# Patient Record
Sex: FEMALE | Race: OTHER | HISPANIC OR LATINO | Employment: UNEMPLOYED | URBAN - METROPOLITAN AREA
[De-identification: names, ages, dates, MRNs, and addresses within clinical notes are randomized per-mention and may not be internally consistent; named-entity substitution may affect disease eponyms.]

---

## 2017-09-07 LAB
EXTERNAL ABO GROUPING: NORMAL
EXTERNAL HEMATOCRIT: 31.1 %
EXTERNAL HEMOGLOBIN: 10.4 G/DL
EXTERNAL HEPATITIS B SURFACE ANTIGEN: NON REACTIVE
EXTERNAL HIV-1 ANTIBODY: NEGATIVE
EXTERNAL PLATELET COUNT: 260 K/ΜL
EXTERNAL RH FACTOR: POSITIVE
EXTERNAL RUBELLA IGG QUANTITATION: NORMAL
EXTERNAL SYPHILIS RPR SCREEN: NORMAL

## 2017-09-11 LAB — EXTERNAL GROUP B STREP ANTIGEN: POSITIVE

## 2017-09-12 ENCOUNTER — GENERIC CONVERSION - ENCOUNTER (OUTPATIENT)
Dept: OTHER | Facility: OTHER | Age: 37
End: 2017-09-12

## 2017-11-09 ENCOUNTER — GENERIC CONVERSION - ENCOUNTER (OUTPATIENT)
Dept: OTHER | Facility: OTHER | Age: 37
End: 2017-11-09

## 2017-11-09 ENCOUNTER — ALLSCRIPTS OFFICE VISIT (OUTPATIENT)
Dept: OTHER | Facility: OTHER | Age: 37
End: 2017-11-09

## 2017-11-10 ENCOUNTER — GENERIC CONVERSION - ENCOUNTER (OUTPATIENT)
Dept: OTHER | Facility: OTHER | Age: 37
End: 2017-11-10

## 2017-11-10 ENCOUNTER — HOSPITAL ENCOUNTER (OUTPATIENT)
Facility: HOSPITAL | Age: 37
Discharge: HOME | End: 2017-11-10
Attending: OBSTETRICS & GYNECOLOGY | Admitting: OBSTETRICS & GYNECOLOGY

## 2017-11-10 VITALS
OXYGEN SATURATION: 100 % | DIASTOLIC BLOOD PRESSURE: 77 MMHG | TEMPERATURE: 97.9 F | HEART RATE: 106 BPM | RESPIRATION RATE: 18 BRPM | SYSTOLIC BLOOD PRESSURE: 114 MMHG

## 2017-11-10 DIAGNOSIS — O46.90 VAGINAL BLEEDING DURING PREGNANCY, ANTEPARTUM: Primary | ICD-10-CM

## 2017-11-10 DIAGNOSIS — Z3A.35 35 WEEKS GESTATION OF PREGNANCY: ICD-10-CM

## 2017-11-10 PROBLEM — N93.9 VAGINAL BLEEDING: Status: ACTIVE | Noted: 2017-11-10

## 2017-11-10 LAB
AMPHETAMINES SERPL QL SCN: NEGATIVE
BARBITURATES UR QL: NEGATIVE
BENZODIAZ UR QL: NEGATIVE
COCAINE UR QL: NEGATIVE
EXTERNAL GROUP B STREP ANTIGEN: POSITIVE
METHADONE UR QL: NEGATIVE
OPIATES UR QL SCN: NEGATIVE
PCP UR QL: NEGATIVE
THC UR QL: NEGATIVE

## 2017-11-10 PROCEDURE — 80307 DRUG TEST PRSMV CHEM ANLYZR: CPT | Performed by: FAMILY MEDICINE

## 2017-11-10 PROCEDURE — 76817 TRANSVAGINAL US OBSTETRIC: CPT | Performed by: OBSTETRICS & GYNECOLOGY

## 2017-11-10 PROCEDURE — 99215 OFFICE O/P EST HI 40 MIN: CPT

## 2017-11-10 PROCEDURE — 76811 OB US DETAILED SNGL FETUS: CPT | Performed by: OBSTETRICS & GYNECOLOGY

## 2017-11-10 RX ORDER — FERROUS SULFATE 325(65) MG
325 TABLET ORAL
COMMUNITY
End: 2019-11-19 | Stop reason: ALTCHOICE

## 2017-11-10 NOTE — PROGRESS NOTES
Consult Note - M  Natasha Cleary 44 y o  female MRN: [id number]  Unit/Bed#:  Triage  Encounter: [id number]    HPI:   Natasha Cleary is a 44-year-old  0-3 at 35 weeks and 5 days gestation who presented with painless vaginal bleeding for 5 days  She has previously seen for her prenatal intake interview at [facility] in [place] reports that she mentioned vaginal bleeding at that prenatal intake  There is concern for possible placenta previa or low lying placenta which prompted a Maternal-Fetal Medicine consult  Patient denies contractions, loss of fluid  She reports good fetal movement  Patient states vaginal bleeding is scant and approximated 1 tsp of vaginal bleeding noted on tissue paper when she wipes after urinating  This intermittent vaginal bleeding has been occurring for past 5 days  Patient reports most recent sexual intercourse occurred approximately 15 days ago  Speculum examination was performed and notable for negative evaluation via wet mount and KOH for any signs of infection  Speculum was also notable for lack of blood within the vagina, lack of cervical lacerations or trauma, lack of vaginal lacerations as source of bleeding  Her prenatal course is notable for late transfer of care to [facility] due to recent relocation to [facility]  Patient was previously receiving prenatal care in [address] [place] [address],[address] with ultrasounds pain done at [facility]  /77   Pulse (!) 106   Temp 97 9 °F (36 6 °C) (Oral)   Resp 18   LMP 2017   SpO2 100%     Physical Exam:   General: AAOx3  No acute distress  Heart: Regular rate & rhythm  No murmurs/clicks/gallops  Lungs: Clear bilaterally  Normal effort  No wheezes/rales/rhonchi  Abdominal: Soft, non-tender gravid uterus  Extremities: No TTP  Lower limbs symmetric bilaterally       FHT:  Baseline Rate: 130 bpm  Variability: Moderate 6-25 bpm  Accelerations: 15 x 15 or greater, At variable times  Decelerations: None  FHR Category: Category I  TOCO:   Contraction Frequency (minutes): 3-4  Contraction Duration (seconds): 60-80  Contraction Quality: Mild    Ultraounds:   17 -- dating ultrasound  Ultrasound measuring fetus at 25 1 weeks that is in concordance with patient estimated gestational age by last menstrual period 25 4 weeks  Estimated due date 12/10/2017  Fetal anomaly appeared normal on ultrasound  Fetal biometry was appropriate for gestational age and amniotic fluid was normal  Posterior low-lying placenta was noted  17 -- repeat ultrasound was indicated due to missed and suboptimal fetal anatomy at for scan  Again notable on ultrasound report for low lying placenta  Fetal heart was visualized and noted to be normal     10/19/17 -- ultrasound for fetal growth due to advanced maternal age and low lying placenta  Placenta noted to be anterior marginal previa  Adequate fetal growth since last ultrasound  10/26/17 -- ultrasound for fetal evaluation secondary to anterior marginal placenta previa  Placenta noted to be right lateral on examination via ultrasound  A/P: Toshia Daniel is a 44 y o  [id number] at 35w5d who presented with concerns for placenta previa and vaginal bleeding  Currently in stable condition  Hospital Day: 1  1) Formal ultrasound performed by [facility]  No signs of placenta previa  Ultrasound placed placenta at R lateral and not near internal os of cervix  - fetus is vertex and growth is appropriate for gestational age  2) Bleeding:  - minimal and speculum exam performed previously showed no signs of blood in the vagina, cervical lacerations, and no signs of trauma or dilation    - CL 5cm by transvaginal ultrasound  - no visual dilation  Recommend twice weekly NST and weekly TANI for further surveillance    center  Patient was set up for 1st NST and formal growth ultrasound at  Center on Wednesday, 11/15, at 08:00    Corie Foote MD  OBGYN, PGY3  11/11/2017 3:53 AM

## 2017-11-10 NOTE — PROGRESS NOTES
Triage Note - OB  Natasha Fagan 44 y o  female MRN: [id number]  Unit/Bed#: LD Triage 4-01 Encounter: [id number]    Chief Complaint: Vaginal bleeding  CATHY: Estimated Date of Delivery: 12/10/17    HPI: Patient is a 44 y [id number] at 35w5d here complaining of painless vaginal bleeding x 5 days  ~1teaspoon of liquid blood in the toilet bowl 1-2 times daily, which has progressively gotten worse and is now up to ~5-6 times daily  She had no bleeding yesterday and last episode was this morning  x2  She reports good Fm and denies any recent history of vaginal intercourse or other objects in vagina, abdominal trauma/pain, LOF, UC's, vagina discharge, dysuria, hematochezia, headaches, b/v, n/v/c/d, fevers, chills, recreational drug use  Vitals: Blood pressure 114/77, pulse (!) 106, temperature 97 9 °F (36 6 °C), temperature source Oral, resp  rate 18, last menstrual period 03/05/2017, SpO2 100 %  ,There is no height or weight on file to calculate BMI      Physical Exam  General: No Acute Distress   Cardiovascular: Regular, Rate and Rhythm, no murmur, gallop or rub   Lungs: Clear to Auscultation Bilaterally, no wheezing, rhonchi or rales   Abdomen: Gravid, Soft, non-tender, no guarding or CVA tenderness   Lower Extremities: Non-Tender     SVE: Deferred    FHR: Baseline: 150 bpm,   Variability: Moderate 6 - 25 bpm,   Accelerations: Reactive,   Decelerations: Absent  Category reassuring reactive    [place]: irregular, every 5-10 minutes (patient not feeling contractions)    Labs:   Recent Results (from the past 24 hour(s))   Strep B DNA probe, amplification    Collection Time: 11/10/17 12:00 AM   Result Value Ref Range    External Strep Group B Ag Positive (A) Negative, Unknown, None, Pending   Rapid drug screen, urine    Collection Time: 11/10/17 12:56 PM   Result Value Ref Range    Amph/Meth UR Negative Negative    Barbiturate Ur Negative Negative    Benzodiazepine Urine Negative Negative    Cocaine Urine Negative Negative Methadone Urine Negative Negative    Opiate Urine Negative Negative    PCP Ur Negative Negative    THC Urine Negative Negative     Imaging: TVUS & TAUS today performed by Dr Alejandra Barr showed low lying anterior placenta, no clear evidence of previa  Lab, Imaging and other studies: I have personally reviewed pertinent reports  Assessment/Plan: 44 y [id number] at 35w5d here complaining of vaginal bleeding x 2 days  1) External Fetal Monitoring:  Reactive/Reassuring  2) TAUS/TVUS: Low lying anterior placenta, no clear evidence of previa  3) Saline, Dry and KOH mount negative for Leuks, RBCs, Ferning, and Hyphae/Fungi  4) Urine Dip negative for Protein, Leuks, Blood, Nitrites, Glucose, Ph:7 0  5) Labs:    UDS: Negative (including negative for cocaine)   GBS positive    Other labs including coags, fibrinogen unnecessary at this time  6) MFM team consulted and cleared patient for discharge  We appreciate their input  Please view MFM note for details  Per MFM: Schedule Biweekly NST, Weekly TANI, and Anatomy US ASAP  7) Dispo: Discharge home today without any further interventions  Strict labor precautions provided and reviewed with patient and  using the blue phone  Return to clinic in 2-3 days        Enoch Abraham MD  Family Practice Resident PGY1  11/10/2017 3:15 PM

## 2017-11-10 NOTE — DISCHARGE INSTR - AVS FIRST PAGE
Please call and make an appointement to Return to the clinic, [facility], in 2-3 days to schedule biweekly NST, weekly TANI and Full anatomy Us, per MFM  Please read all aftercare educational resources thoroughly

## 2017-11-10 NOTE — DISCHARGE INSTRUCTIONS
El embarazo de la semana 35 a la 38   LO QUE NECESITA SABER:   Al principio de las 37 semanas se considera que usted está en término completo  Podría ser mas fácil que usted respire si beck bebé se ha posicionado con la juan hacia abajo  Es posible que usted necesite orinar con mayor frecuencia ya que el bebé podría estar presionando beck vejiga  Usted también podría sentir más molestias y cansarse fácilmente  INSTRUCCIONES SOBRE EL NARCISA HOSPITALARIA:   Regrese a la delfino de emergencias si:   Usted presenta un efren dolor de juan que no desaparece  Usted tiene [employer] visión nuevos o en aumento, loreta visión borrosa o con manchas  Usted tiene inflamación nueva o creciente en beck hua o maki  Usted tiene manchado o sangrado vaginal     Usted rompe elyssa o siente un chorro o gotas de agua tibia que le está bajando por beck vagina  Pregúntele a beck [employer] vitaminas y minerales son adecuados para usted  Usted tiene más de 5 contracciones en 1 hora  Usted nota algún [employer] movimientos de beck bebé  Usted tiene calambres, presión o tensión abdominal     Usted tiene un cambio en la secreción vaginal     Usted tiene escalofríos o fiebre  Usted tiene comezón, ardor o dolor vaginal      Usted tiene kennedy secreción vaginal amarillenta, verdosa, bobbi o de [employer]  Usted tiene dolor o ardor al [place], Kody Lowe menos de lo habitual o tiene orina rosada o sanguinolenta  Usted tiene preguntas o inquietudes acerca de beck condición o cuidado  Cómo cuidarse en esta etapa de beck embarazo:   Consuma alimentos saludables y variados  Alimentos saludables incluyen frutas, verduras, panes de obey integral, alimentos lácteos bajos en grasa, frijoles, sheyla magras y pescado  [place] líquidos loreta se le haya indicado  Pregunte cuánto líquido debe evaristo cada día y cuáles líquidos son los más adecuados para usted  Limite el consumo de cafeína a menos de [place] [address]   Limite el consumo de pescado a 2 porciones cada semana  Escoja pescado con concentraciones bajas de bridget loreta atún ligero enlatado, camarón, cangrejo, salmón, bacalao o tilapia  No  coma pescado con concentraciones altas de bridget loreta pez jania, caballa gigante, pargo rayado y tiburón  [address] [place] [address]  Beck necesidad de ciertas vitaminas y [address], loreta el ácido fólico, aumenta aida el [facility]  Las vitaminas prenatales proporcionan algunas de las vitaminas y minerales adicionales que usted necesita  Las vitaminas prenatales también podrían ayudar a disminuir el riesgo de ciertos defectos de nacimiento  Descanse tanto loreta sea necesario  Levante julia pies si tiene inflamación en julia tobillos y pies  No fume  Si usted fuma, nunca es demasiado tarde para dejar de hacerlo  Fumar aumenta el riesgo de aborto espontáneo y otros problemas de alize aida beck [facility]  Fumar puede causar que beck bebé nazca antes de tiempo o que pese menos al nacer  Solicite información a beck médico si usted necesita ayuda para dejar de fumar  No consuma alcohol  El alcohol pasa de beck cuerpo al bebé a través de la placenta  Puede afectar el desarrollo del cerebro de beck bebé y provocar el síndrome de alcoholismo fetal (SAF)  FAS es un tamiko de condiciones que causan [address], de comportamiento y de crecimiento  Consulte con beck médico antes de evaristo cualquier medicamento  Muchos medicamentos pueden perjudicar a beck bebé si usted los cricket [address]  No tome ningún medicamento, vitaminas, hierbas o suplementos sin socorro consultar con beck Wendy Stone  use drogas ilegales o de la cabrera (loreta marihuana o cocaína) mientras está embarazada  Hable con beck médico antes de viajar  Es posible que no pueda viajar en avión después de las 36 [facility]  También le puede recomendar que evite largos viajes por carretera  Consejos de seguridad:   Evite jacuzzis y saunas    No use un jacuzzi o un sauna mientras usted está embarazada, especialmente aida el primer trimestre  Los [employer] y los saunas aumentan la temperatura de tenorio bebé y el riesgo de defectos de nacimiento  Evite la toxoplasmosis  [place] es kennedy infección causada por comer carne cruda o estar cerca del excremento de un layla infectado  [place] puede causar malformaciones congénitas, aborto espontáneo y [place]  Lávese las maki después de tocar carne cruda  Asegúrese de que la carne esté za cocida antes de comerla  Evite los huevos crudos y la [place]  Use guantes o pida que alguien la ayude a limpiar la caja de arena del layla mientras usted Lana Valles  Consulte con tenorio médico acerca de viajar  El [address] cómodo para viajar es aida el irwin trimestre  Pregunte a tenorio médico si usted puede viajar después de las 36 semanas  Es posible que no pueda viajar en avión después de las 36 [address] [place]  También le puede recomendar que evite largos viajes por carretera  Cambios que están ocurriendo con tenorio bebé:  Para las 38 semanas, tenorio bebé podría pesar entre 6 y 5 libras  Tenorio bebé podría medir alrededor de 14 pulgadas de lita desde la punta de la juan hasta la rabadilla (parte inferior del bebé)  Tenorio bebé escucha lo suficientemente za loreta para reconocer tenorio voz  Conforme tenorio bebé crece más, usted podría sentir menos patadas y sentir más que tenorio bebé se estira y [place]  Tenorio bebé podría moverse en posición con la juan hacia abajo  Tenorio bebé también descansará en la parte inferior de tenorio abdomen  Lo que necesita saber acerca del cuidado prenatal:  Tenorio médico le revisará tenorio presión arterial y [place]  Es posible que también necesite lo siguiente:  Un examen de orina  también podría realizarse para revisarle el azúcar y la proteína  Estas son señales de diabetes gestacional o de infección  La proteína en tenorio orina también podría ser kennedy señal de preeclampsia   La preeclampsia es kennedy condición que puede desarrollarse aida la semana 20 o después en tenorio embarazo  Esta provoca presión arterial sesar y [employer] con julia riñones y [place]  Los análisis de gloria  se pueden realizar para revisar si tiene signos de anemia (nivel bajo del yamel)  La vacuna Tdap  podría ser recomendado por tenorio médico      El examen del estreptococo del tamiko B  es un examen que se realiza para verificar si hay infección por estreptococos del tamiko B  El estreptococo del tamiko B es un tipo de bacteria que se puede encontrar en la vagina o el recto  Podría ser transmitida a tenorio bebé aida el parto si usted la tiene  Tenorio médico podría evaristo [place] de tenorio vagina o recto y ana la [place] al laboratorio para que la examinen  La altura uterina  es kennedy medición del útero para controlar el desarrollo de tenorio bebé  [employer] por lo general es igual al número de [facility] que usted tiene de embarazo  Tenorio médico también podría revisar la posición de tenorio bebé  El ritmo cardíaco de tenorio bebé  será revisado  © 2017 [address] Information is for End User's use only and may not be sold, redistributed or otherwise used for commercial purposes  All illustrations and images included in CareNotes® are the copyrighted property of A D A M , Inc  or Erlin Conner  Esta información es sólo para uso en educación  Tenorio intención no es darle un consejo médico sobre enfermedades o tratamientos  Colsulte con tenorio Ladean Artist farmacéutico antes de seguir cualquier régimen médico para saber si es seguro y efectivo para usted  Pregnancy at 28 to [address]:   You are considered full term at the beginning of 37 weeks  Your breathing may be easier if your baby has moved down into a head-down position  You may need to urinate more often because the baby may be pressing on your bladder  You may also feel more discomfort and get tired easily     DISCHARGE INSTRUCTIONS:   Please do keep in mind that in your particular case, as you are well aware based on your discussion with us,  the low lying placenta and recent history of vaginal bleeding requires extra caution  Continue to avoid vaginal intercourse and avoid inserting any objects (including tampons, fingers, toys etc ) inside vagina, as you have been  If your do experience increased vaginal bleeding, as per our discussion today, do not delay seeking medical care  Seek care immediately if:   · You develop a severe headache that does not go away  · You have new or increased vision changes, such as blurred or spotted vision  · You have new or increased swelling in your face or hands  · You have vaginal spotting or bleeding  · Your water broke or you feel warm water gushing or trickling from your vagina  Contact your healthcare provider if:   · You have more than 5 contractions in 1 hour  · You notice any changes in your baby's movements  · You have abdominal cramps, pressure, or tightening  · You have a change in vaginal discharge  · You have chills or a fever  · You have vaginal itching, burning, or pain  · You have yellow, green, white, or foul-smelling vaginal discharge  · You have pain or burning when you urinate, less urine than usual, or pink or bloody urine  · You have questions or concerns about your condition or care  How to care for yourself at this stage of your pregnancy:   · Eat a variety of healthy foods  Healthy foods include fruits, vegetables, whole-grain breads, low-fat dairy foods, beans, lean meats, and fish  Drink liquids as directed  Ask how much liquid to drink each day and which liquids are best for you  Limit caffeine to less than 200 milligrams each day  Limit your intake of fish to 2 servings each week  Choose fish low in mercury such as canned light tuna, shrimp, salmon, cod, or tilapia  Do not  eat fish high in mercury such as swordfish, tilefish, venessa mackerel, and shark       · Take prenatal vitamins as directed  Your need for certain vitamins and minerals, such as folic acid, increases during pregnancy  Prenatal vitamins provide some of the extra vitamins and minerals you need  Prenatal vitamins may also help to decrease the risk of certain birth defects  · Rest as needed  Put your feet up if you have swelling in your ankles and feet  · Do not smoke  If you smoke, it is never too late to quit  Smoking increases your risk of a miscarriage and other health problems during your pregnancy  Smoking can cause your baby to be born too early or weigh less at birth  Ask your healthcare provider for information if you need help quitting  · Do not drink alcohol  Alcohol passes from your body to your baby through the placenta  It can affect your baby's brain development and cause fetal alcohol syndrome (FAS)  FAS is a group of conditions that causes mental, behavior, and growth problems  · Talk to your healthcare provider before you take any medicines  Many medicines may harm your baby if you take them when you are pregnant  Do not take any medicines, vitamins, herbs, or supplements without first talking to your healthcare provider  Never use illegal or street drugs (such as marijuana or cocaine) while you are pregnant  · Talk to your healthcare provider before you travel  You may not be able to travel in an airplane after 36 weeks  He may also recommend that you avoid long road trips  Safety tips:   · Avoid hot tubs and saunas  Do not use a hot tub or sauna while you are pregnant, especially during your first trimester  Hot tubs and saunas may raise your baby's temperature and increase the risk of birth defects  · Avoid toxoplasmosis  This is an infection caused by eating raw meat or being around infected cat feces  It can cause birth defects, miscarriages, and other problems  Wash your hands after you touch raw meat  Make sure any meat is well-cooked before you eat it  Avoid raw eggs and unpasteurized milk  Use gloves or ask someone else to clean your cat's litter box while you are pregnant  · Ask your healthcare provider about travel  The most comfortable time to travel is during the second trimester  Ask your healthcare provider if you can travel after 36 weeks  You may not be able to travel in an airplane after 36 weeks  He may also recommend that you avoid long road trips  Changes that are happening with your baby:  By 38 weeks, your baby may weigh between 6 and 9 pounds  Your baby may be about 14 inches long from the top of the head to the rump (baby's bottom)  Your baby hears well enough to know your voice  As your baby gets larger, you may feel fewer kicks and more stretching and rolling  Your baby may move into a head-down position  Your baby will also rest lower in your abdomen  What you need to know about prenatal care: Your healthcare provider will check your blood pressure and weight  You may also need the following:  · A urine test  may also be done to check for sugar and protein  These can be signs of gestational diabetes or infection  Protein in your urine may also be a sign of preeclampsia  Preeclampsia is a condition that can develop during week 20 or later of your pregnancy  It causes high blood pressure, and it can cause problems with your kidneys and other organs  · A blood test  may be done to check for anemia (low iron level)  · A Tdap vaccine  may be recommended by your healthcare provider  · A group B strep test  is a test that is done to check for group B strep infection  Group B strep is a type of bacteria that may be found in the vagina or rectum  It can be passed to your baby during delivery if you have it  Your healthcare provider will take swab your vagina or rectum and send the sample to the lab for tests  · Fundal height  is a measurement of your uterus to check your baby's growth   This number is usually the same as the number of weeks that you have been pregnant  Your healthcare provider may also check your baby's position  · Your baby's heart rate  will be checked  © 2017 [address] Information is for End User's use only and may not be sold, redistributed or otherwise used for commercial purposes  All illustrations and images included in CareNotes® are the copyrighted property of A D A M , Inc  or Erlin Conner  The above information is an  only  It is not intended as medical advice for individual conditions or treatments  Talk to your doctor, nurse or pharmacist before following any medical regimen to see if it is safe and effective for you

## 2017-11-21 ENCOUNTER — ALLSCRIPTS OFFICE VISIT (OUTPATIENT)
Dept: PERINATAL CARE | Facility: CLINIC | Age: 37
End: 2017-11-21

## 2017-11-21 ENCOUNTER — GENERIC CONVERSION - ENCOUNTER (OUTPATIENT)
Dept: OTHER | Facility: OTHER | Age: 37
End: 2017-11-21

## 2017-11-21 PROCEDURE — 76816 OB US FOLLOW-UP PER FETUS: CPT | Performed by: OBSTETRICS & GYNECOLOGY

## 2017-11-21 PROCEDURE — 90686 IIV4 VACC NO PRSV 0.5 ML IM: CPT | Performed by: OBSTETRICS & GYNECOLOGY

## 2017-11-21 PROCEDURE — 90686 IIV4 VACC NO PRSV 0.5 ML IM: CPT

## 2017-11-21 PROCEDURE — 76817 TRANSVAGINAL US OBSTETRIC: CPT | Performed by: OBSTETRICS & GYNECOLOGY

## 2017-12-11 ENCOUNTER — GENERIC CONVERSION - ENCOUNTER (OUTPATIENT)
Dept: OTHER | Facility: OTHER | Age: 37
End: 2017-12-11

## 2017-12-12 ENCOUNTER — ANESTHESIA (INPATIENT)
Dept: LABOR AND DELIVERY | Facility: HOSPITAL | Age: 37
End: 2017-12-12
Payer: COMMERCIAL

## 2017-12-12 ENCOUNTER — ALLSCRIPTS OFFICE VISIT (OUTPATIENT)
Dept: OTHER | Facility: OTHER | Age: 37
End: 2017-12-12

## 2017-12-12 ENCOUNTER — HOSPITAL ENCOUNTER (INPATIENT)
Facility: HOSPITAL | Age: 37
LOS: 3 days | Discharge: HOME | End: 2017-12-15
Attending: OBSTETRICS & GYNECOLOGY | Admitting: OBSTETRICS & GYNECOLOGY
Payer: COMMERCIAL

## 2017-12-12 ENCOUNTER — ANESTHESIA EVENT (INPATIENT)
Dept: LABOR AND DELIVERY | Facility: HOSPITAL | Age: 37
End: 2017-12-12
Payer: COMMERCIAL

## 2017-12-12 DIAGNOSIS — IMO0002 ABNORMAL FETAL HEART RATE: ICD-10-CM

## 2017-12-12 DIAGNOSIS — Z3A.40 40 WEEKS GESTATION OF PREGNANCY: Primary | ICD-10-CM

## 2017-12-12 PROBLEM — B95.1 POSITIVE GBS TEST: Status: ACTIVE | Noted: 2017-12-12

## 2017-12-12 PROBLEM — Z3A.35 35 WEEKS GESTATION OF PREGNANCY: Status: RESOLVED | Noted: 2017-11-10 | Resolved: 2017-12-12

## 2017-12-12 LAB
ABO GROUP BLD: NORMAL
ALBUMIN SERPL BCP-MCNC: 2.6 G/DL (ref 3.5–5)
ALP SERPL-CCNC: 216 U/L (ref 46–116)
ALT SERPL W P-5'-P-CCNC: 13 U/L (ref 12–78)
ANION GAP SERPL CALCULATED.3IONS-SCNC: 9 MMOL/L (ref 4–13)
AST SERPL W P-5'-P-CCNC: 19 U/L (ref 5–45)
BACTERIA UR QL AUTO: NORMAL /HPF
BASOPHILS # BLD AUTO: 0.01 THOUSANDS/ΜL (ref 0–0.1)
BASOPHILS NFR BLD AUTO: 0 % (ref 0–1)
BILIRUB SERPL-MCNC: 0.39 MG/DL (ref 0.2–1)
BILIRUB UR QL STRIP: NEGATIVE
BLD GP AB SCN SERPL QL: NEGATIVE
BUN SERPL-MCNC: 7 MG/DL (ref 5–25)
CALCIUM SERPL-MCNC: 8.9 MG/DL (ref 8.3–10.1)
CHLORIDE SERPL-SCNC: 103 MMOL/L (ref 100–108)
CLARITY UR: CLEAR
CO2 SERPL-SCNC: 21 MMOL/L (ref 21–32)
COLOR UR: YELLOW
CREAT SERPL-MCNC: 0.55 MG/DL (ref 0.6–1.3)
CREAT UR-MCNC: 29.6 MG/DL
EOSINOPHIL # BLD AUTO: 0.1 THOUSAND/ΜL (ref 0–0.61)
EOSINOPHIL NFR BLD AUTO: 2 % (ref 0–6)
ERYTHROCYTE [DISTWIDTH] IN BLOOD BY AUTOMATED COUNT: 15.6 % (ref 11.6–15.1)
GFR SERPL CREATININE-BSD FRML MDRD: 119 ML/MIN/1.73SQ M
GLUCOSE SERPL-MCNC: 52 MG/DL (ref 65–140)
GLUCOSE UR STRIP-MCNC: NEGATIVE MG/DL
HCT VFR BLD AUTO: 33.7 % (ref 34.8–46.1)
HGB BLD-MCNC: 10.9 G/DL (ref 11.5–15.4)
HGB UR QL STRIP.AUTO: NEGATIVE
HYALINE CASTS #/AREA URNS LPF: NORMAL /LPF
KETONES UR STRIP-MCNC: NEGATIVE MG/DL
LEUKOCYTE ESTERASE UR QL STRIP: NEGATIVE
LYMPHOCYTES # BLD AUTO: 1.62 THOUSANDS/ΜL (ref 0.6–4.47)
LYMPHOCYTES NFR BLD AUTO: 27 % (ref 14–44)
MCH RBC QN AUTO: 26.8 PG (ref 26.8–34.3)
MCHC RBC AUTO-ENTMCNC: 32.3 G/DL (ref 31.4–37.4)
MCV RBC AUTO: 83 FL (ref 82–98)
MONOCYTES # BLD AUTO: 0.58 THOUSAND/ΜL (ref 0.17–1.22)
MONOCYTES NFR BLD AUTO: 10 % (ref 4–12)
NEUTROPHILS # BLD AUTO: 3.63 THOUSANDS/ΜL (ref 1.85–7.62)
NEUTS SEG NFR BLD AUTO: 61 % (ref 43–75)
NITRITE UR QL STRIP: NEGATIVE
NON-SQ EPI CELLS URNS QL MICRO: NORMAL /HPF
NRBC BLD AUTO-RTO: 0 /100 WBCS
PH UR STRIP.AUTO: 6.5 [PH] (ref 4.5–8)
PLATELET # BLD AUTO: 292 THOUSANDS/UL (ref 149–390)
PMV BLD AUTO: 10.9 FL (ref 8.9–12.7)
POTASSIUM SERPL-SCNC: 4.4 MMOL/L (ref 3.5–5.3)
PROT SERPL-MCNC: 8 G/DL (ref 6.4–8.2)
PROT UR STRIP-MCNC: NEGATIVE MG/DL
PROT UR-MCNC: 19 MG/DL
PROT/CREAT UR: 0.64 MG/G{CREAT} (ref 0–0.1)
RBC # BLD AUTO: 4.06 MILLION/UL (ref 3.81–5.12)
RBC #/AREA URNS AUTO: NORMAL /HPF
RH BLD: POSITIVE
SODIUM SERPL-SCNC: 133 MMOL/L (ref 136–145)
SP GR UR STRIP.AUTO: 1.01 (ref 1–1.03)
SPECIMEN EXPIRATION DATE: NORMAL
UROBILINOGEN UR QL STRIP.AUTO: 0.2 E.U./DL
WBC # BLD AUTO: 5.96 THOUSAND/UL (ref 4.31–10.16)
WBC #/AREA URNS AUTO: NORMAL /HPF

## 2017-12-12 PROCEDURE — 86920 COMPATIBILITY TEST SPIN: CPT

## 2017-12-12 PROCEDURE — 82570 ASSAY OF URINE CREATININE: CPT | Performed by: STUDENT IN AN ORGANIZED HEALTH CARE EDUCATION/TRAINING PROGRAM

## 2017-12-12 PROCEDURE — 86850 RBC ANTIBODY SCREEN: CPT | Performed by: FAMILY MEDICINE

## 2017-12-12 PROCEDURE — 81001 URINALYSIS AUTO W/SCOPE: CPT | Performed by: STUDENT IN AN ORGANIZED HEALTH CARE EDUCATION/TRAINING PROGRAM

## 2017-12-12 PROCEDURE — 80053 COMPREHEN METABOLIC PANEL: CPT | Performed by: STUDENT IN AN ORGANIZED HEALTH CARE EDUCATION/TRAINING PROGRAM

## 2017-12-12 PROCEDURE — 4A1HXCZ MONITORING OF PRODUCTS OF CONCEPTION, CARDIAC RATE, EXTERNAL APPROACH: ICD-10-PCS | Performed by: OBSTETRICS & GYNECOLOGY

## 2017-12-12 PROCEDURE — 86592 SYPHILIS TEST NON-TREP QUAL: CPT | Performed by: FAMILY MEDICINE

## 2017-12-12 PROCEDURE — 82805 BLOOD GASES W/O2 SATURATION: CPT | Performed by: OBSTETRICS & GYNECOLOGY

## 2017-12-12 PROCEDURE — 86900 BLOOD TYPING SEROLOGIC ABO: CPT | Performed by: FAMILY MEDICINE

## 2017-12-12 PROCEDURE — 84156 ASSAY OF PROTEIN URINE: CPT | Performed by: STUDENT IN AN ORGANIZED HEALTH CARE EDUCATION/TRAINING PROGRAM

## 2017-12-12 PROCEDURE — 88307 TISSUE EXAM BY PATHOLOGIST: CPT | Performed by: OBSTETRICS & GYNECOLOGY

## 2017-12-12 PROCEDURE — 86901 BLOOD TYPING SEROLOGIC RH(D): CPT | Performed by: FAMILY MEDICINE

## 2017-12-12 PROCEDURE — 85025 COMPLETE CBC W/AUTO DIFF WBC: CPT | Performed by: FAMILY MEDICINE

## 2017-12-12 RX ORDER — MORPHINE SULFATE 1 MG/ML
INJECTION, SOLUTION EPIDURAL; INTRATHECAL; INTRAVENOUS AS NEEDED
Status: DISCONTINUED | OUTPATIENT
Start: 2017-12-12 | End: 2017-12-13 | Stop reason: SURG

## 2017-12-12 RX ORDER — FENTANYL CITRATE 50 UG/ML
INJECTION, SOLUTION INTRAMUSCULAR; INTRAVENOUS AS NEEDED
Status: DISCONTINUED | OUTPATIENT
Start: 2017-12-12 | End: 2017-12-13 | Stop reason: SURG

## 2017-12-12 RX ORDER — SODIUM CHLORIDE, SODIUM LACTATE, POTASSIUM CHLORIDE, CALCIUM CHLORIDE 600; 310; 30; 20 MG/100ML; MG/100ML; MG/100ML; MG/100ML
125 INJECTION, SOLUTION INTRAVENOUS CONTINUOUS
Status: DISCONTINUED | OUTPATIENT
Start: 2017-12-12 | End: 2017-12-13

## 2017-12-12 RX ORDER — ONDANSETRON 2 MG/ML
4 INJECTION INTRAMUSCULAR; INTRAVENOUS EVERY 6 HOURS PRN
Status: DISCONTINUED | OUTPATIENT
Start: 2017-12-12 | End: 2017-12-13

## 2017-12-12 RX ORDER — BUPIVACAINE HYDROCHLORIDE 7.5 MG/ML
INJECTION, SOLUTION INTRASPINAL AS NEEDED
Status: DISCONTINUED | OUTPATIENT
Start: 2017-12-12 | End: 2017-12-13 | Stop reason: SURG

## 2017-12-12 RX ORDER — OXYTOCIN/RINGER'S LACTATE 30/500 ML
1-30 PLASTIC BAG, INJECTION (ML) INTRAVENOUS
Status: DISCONTINUED | OUTPATIENT
Start: 2017-12-12 | End: 2017-12-13

## 2017-12-12 RX ORDER — BUTORPHANOL TARTRATE 1 MG/ML
1 INJECTION, SOLUTION INTRAMUSCULAR; INTRAVENOUS ONCE
Status: COMPLETED | OUTPATIENT
Start: 2017-12-12 | End: 2017-12-12

## 2017-12-12 RX ADMIN — SODIUM CHLORIDE 2.5 MILLION UNITS: 9 INJECTION, SOLUTION INTRAVENOUS at 19:09

## 2017-12-12 RX ADMIN — CEFAZOLIN SODIUM 2000 MG: 2 SOLUTION INTRAVENOUS at 23:10

## 2017-12-12 RX ADMIN — SODIUM CHLORIDE, SODIUM LACTATE, POTASSIUM CHLORIDE, AND CALCIUM CHLORIDE 125 ML/HR: .6; .31; .03; .02 INJECTION, SOLUTION INTRAVENOUS at 10:48

## 2017-12-12 RX ADMIN — SODIUM CHLORIDE 2.5 MILLION UNITS: 9 INJECTION, SOLUTION INTRAVENOUS at 14:53

## 2017-12-12 RX ADMIN — SODIUM CHLORIDE, SODIUM LACTATE, POTASSIUM CHLORIDE, AND CALCIUM CHLORIDE 125 ML/HR: .6; .31; .03; .02 INJECTION, SOLUTION INTRAVENOUS at 16:16

## 2017-12-12 RX ADMIN — AZITHROMYCIN 250 MG: 500 INJECTION, POWDER, LYOPHILIZED, FOR SOLUTION INTRAVENOUS at 23:14

## 2017-12-12 RX ADMIN — MORPHINE SULFATE 0.2 MG: 1 INJECTION, SOLUTION EPIDURAL; INTRATHECAL; INTRAVENOUS at 23:03

## 2017-12-12 RX ADMIN — Medication 2 MILLI-UNITS/MIN: at 16:00

## 2017-12-12 RX ADMIN — SODIUM CHLORIDE, SODIUM LACTATE, POTASSIUM CHLORIDE, AND CALCIUM CHLORIDE: .6; .31; .03; .02 INJECTION, SOLUTION INTRAVENOUS at 22:58

## 2017-12-12 RX ADMIN — SODIUM CHLORIDE 5 MILLION UNITS: 0.9 INJECTION, SOLUTION INTRAVENOUS at 11:04

## 2017-12-12 RX ADMIN — PHENYLEPHRINE HYDROCHLORIDE 50 MCG/MIN: 10 INJECTION INTRAVENOUS at 23:04

## 2017-12-12 RX ADMIN — BUPIVACAINE HYDROCHLORIDE IN DEXTROSE 1.6 ML: 7.5 INJECTION, SOLUTION SUBARACHNOID at 23:03

## 2017-12-12 RX ADMIN — BUTORPHANOL TARTRATE 1 MG: 1 INJECTION, SOLUTION INTRAMUSCULAR; INTRAVENOUS at 20:01

## 2017-12-12 RX ADMIN — FENTANYL CITRATE 10 MCG: 50 INJECTION, SOLUTION INTRAMUSCULAR; INTRAVENOUS at 23:03

## 2017-12-12 RX ADMIN — SODIUM CHLORIDE, SODIUM LACTATE, POTASSIUM CHLORIDE, AND CALCIUM CHLORIDE 125 ML/HR: .6; .31; .03; .02 INJECTION, SOLUTION INTRAVENOUS at 18:21

## 2017-12-12 RX ADMIN — ONDANSETRON 4 MG: 2 INJECTION INTRAMUSCULAR; INTRAVENOUS at 23:13

## 2017-12-12 NOTE — OB LABOR/OXYTOCIN SAFETY PROGRESS
Labor Progress Note - Natasha de Veto Jagual 44 y o  female MRN: [id number]    Unit/Bed#: -01 Encounter: [id number]    Obstetric History       T3      L3     SAB2   TAB0   Ectopic0   Multiple0   Live Births3      Gestational Age: 41w4d     Contraction Frequency (minutes): 2-6  Contraction Quality: Moderate  Tachysystole: No   Dilation: 2-3        Effacement (%): 50  Station: -2  Baseline Rate: 135 bpm  Fetal Heart Rate: 139 BPM  FHR Category: Category I          Notes/comments:     Patient is comfortable  Cervix is unchanged  FHT is category 1  Will start on pit titration      Jaya Rivera MD 2017 3:48 PM

## 2017-12-12 NOTE — H&P
[address] [facility] 44 y o  female MRN: [id number]  Unit/Bed#: -01 Encounter: [id number]    Assessment: 44 y o  [id number] at 40w2d with Positive GBS admitted for labor and spontaneous rupture of membranes  Plan:   · Admit  · CBC, RPR, Type & Screen  · Analgesia at maternal request  · Expectant management  · Positive GBS Prophylaxis    Dr Silvina Burgos aware      Chief Complaint: contractions and leaking fluid    HPI: Natasha cherry Alistair Gomez is a 44 y o  [id number] with positive GBS with an CATHY of 12/10/2017, by Last Menstrual Period at 40w2d who is being admitted for labor  She complains of uterine contractions, occurring every 3-5 minutes, has moderate LOF, and reports no VB  She states she has felt good FM  Pt started to have contractions last night becoming more frequent (every 3-5 minutes), stronger in intensity  Pt noticed "a lot of brown water coming out from my body" at 5 am today and fluid has been still leaking since then  Currently, feels pressure at lower abdomen  Denies fever/chill  Pt went to EOB clinic this morning, was checked ROM (+), 2-3/50/-2 and was transferred to [facility] via EMS  Patient Active Problem List   Diagnosis    Vaginal bleeding    40 weeks gestation of pregnancy    Positive GBS test       Baby complications/comments: none    Review of Systems   Constitutional: Negative for chills, fatigue and fever  HENT: Negative for rhinorrhea and sneezing  Eyes: Negative for photophobia and visual disturbance  Respiratory: Negative for cough, shortness of breath and wheezing  Cardiovascular: Negative for chest pain and palpitations  Gastrointestinal: Positive for abdominal pain  Negative for diarrhea, nausea and vomiting  Genitourinary: Positive for pelvic pain  Negative for flank pain  Musculoskeletal: Positive for back pain  Skin: Negative for rash  Neurological: Negative for seizures, weakness and headaches     Hematological: Does not bruise/bleed easily  Psychiatric/Behavioral: Negative for confusion  OB History    Para Term  AB Living   6 3 3   2     SAB TAB Ectopic Multiple Live Births   2              # Outcome Date GA Lbr Delio/2nd Weight Sex Delivery Anes PTL Lv   6 Current            5 2016 6w0d          4 2015 8w0d          3 Term 07 40w0d  3345 g (7 lb 6 oz) M Vag-Spont      2 Term 02 40w0d  2920 g (6 lb 7 oz) F Vag-Spont      1 Term 98 40w0d  3317 g (7 lb 5 oz) F Vag-Spont  Y           No past medical history on file  No past surgical history on file  Social History   Substance Use Topics    Smoking status: Not on file    Smokeless tobacco: Not on file    Alcohol use Not on file       No Known Allergies    Prescriptions Prior to Admission   Medication    ferrous sulfate 325 (65 Fe) mg tablet    Prenatal MV-Min-Fe Fum-FA-DHA (PRENATAL 1 PO)       Objective: There is no height or weight on file to calculate BMI  Physical Exam:  General: No acute distress  Heart: Regular rate & rhythm  No murmurs/clicks/gallops  Lungs: Clear bilaterally  Normal effort  No wheezes/rales/rhonchi  Abdominal: Soft  NT/ND  Gravid  Extremities: No TTP  Lower limbs symmetric bilaterally       SVE: 2-3/50/-2  Us: vertex       FHT: baseline 150, moderate variability, accels (+); category I     TOCO: every 3-5 minutes     Prenatal Labs: reviewed     Blood type: O+  Antibody: -  GBS: +  HIV: -  Rubella: Immune  VDRL/RPR: Non reactive  HBsAg: Negative  Chlamydia: Negative  Gonorrhea: Negative  Diabetes 1 hour screen: 74  3 hour glucose:   Platelets: 616  Hgb: 10 4  >2 Midnights  INPATIENT     Signature/Title: Jaya Raymundo MD  Date: 2017  Time: 10:59 AM

## 2017-12-12 NOTE — OB LABOR/OXYTOCIN SAFETY PROGRESS
Oxytocin Safety Progress Check Note - Natasha jo ann Castro 44 y o  female MRN: [id number]    Unit/Bed#: -01 Encounter: [id number]    Obstetric History       T3      L3     SAB2   TAB0   Ectopic0   Multiple0   Live Births3      Gestational Age: 41w4d  Dose (carlos-units/min) Oxytocin: 4 carlos-units/min  Contraction Frequency (minutes): 2-3  Contraction Quality: Moderate  Tachysystole: No   Dilation: 3        Effacement (%): 50  Station: -2  Baseline Rate: 160 bpm  Fetal Heart Rate: 165 BPM  FHR Category: Category I          Notes/comments:      Patient doing well  Refused epidural  Pit currently at 6  Continue to closely monitor    Preeclampsia DX monitor BPs: WI/CR0 64     Ema Soto MD 2017 5:58 PM

## 2017-12-13 PROBLEM — Z98.891 S/P CESAREAN SECTION: Status: ACTIVE | Noted: 2017-12-13

## 2017-12-13 LAB
APTT PPP: 22 SECONDS (ref 23–35)
BASE EXCESS BLDCOA CALC-SCNC: -4.8 MMOL/L (ref 3–11)
BASE EXCESS BLDCOV CALC-SCNC: -3.9 MMOL/L (ref 1–9)
BASOPHILS # BLD AUTO: 0 THOUSANDS/ΜL (ref 0–0.1)
BASOPHILS # BLD AUTO: 0.01 THOUSANDS/ΜL (ref 0–0.1)
BASOPHILS NFR BLD AUTO: 0 % (ref 0–1)
BASOPHILS NFR BLD AUTO: 0 % (ref 0–1)
EOSINOPHIL # BLD AUTO: 0.05 THOUSAND/ΜL (ref 0–0.61)
EOSINOPHIL # BLD AUTO: 0.06 THOUSAND/ΜL (ref 0–0.61)
EOSINOPHIL NFR BLD AUTO: 0 % (ref 0–6)
EOSINOPHIL NFR BLD AUTO: 1 % (ref 0–6)
ERYTHROCYTE [DISTWIDTH] IN BLOOD BY AUTOMATED COUNT: 15.8 % (ref 11.6–15.1)
ERYTHROCYTE [DISTWIDTH] IN BLOOD BY AUTOMATED COUNT: 15.9 % (ref 11.6–15.1)
HBV SURFACE AG SER QL: NORMAL
HCO3 BLDCOA-SCNC: 23.3 MMOL/L (ref 17.3–27.3)
HCO3 BLDCOV-SCNC: 23.4 MMOL/L (ref 12.2–28.6)
HCT VFR BLD AUTO: 27.3 % (ref 34.8–46.1)
HCT VFR BLD AUTO: 28.7 % (ref 34.8–46.1)
HCV AB SER QL: NORMAL
HGB BLD-MCNC: 8.4 G/DL (ref 11.5–15.4)
HGB BLD-MCNC: 9.4 G/DL (ref 11.5–15.4)
HIV 1+2 AB+HIV1 P24 AG SERPL QL IA: NORMAL
HIV1 P24 AG SER QL: NORMAL
INR PPP: 1.03 (ref 0.86–1.16)
LYMPHOCYTES # BLD AUTO: 1.32 THOUSANDS/ΜL (ref 0.6–4.47)
LYMPHOCYTES # BLD AUTO: 1.48 THOUSANDS/ΜL (ref 0.6–4.47)
LYMPHOCYTES NFR BLD AUTO: 12 % (ref 14–44)
LYMPHOCYTES NFR BLD AUTO: 22 % (ref 14–44)
MCH RBC QN AUTO: 25.7 PG (ref 26.8–34.3)
MCH RBC QN AUTO: 26.9 PG (ref 26.8–34.3)
MCHC RBC AUTO-ENTMCNC: 30.8 G/DL (ref 31.4–37.4)
MCHC RBC AUTO-ENTMCNC: 32.8 G/DL (ref 31.4–37.4)
MCV RBC AUTO: 82 FL (ref 82–98)
MCV RBC AUTO: 84 FL (ref 82–98)
MONOCYTES # BLD AUTO: 0.53 THOUSAND/ΜL (ref 0.17–1.22)
MONOCYTES # BLD AUTO: 0.82 THOUSAND/ΜL (ref 0.17–1.22)
MONOCYTES NFR BLD AUTO: 7 % (ref 4–12)
MONOCYTES NFR BLD AUTO: 8 % (ref 4–12)
NEUTROPHILS # BLD AUTO: 4.67 THOUSANDS/ΜL (ref 1.85–7.62)
NEUTROPHILS # BLD AUTO: 8.98 THOUSANDS/ΜL (ref 1.85–7.62)
NEUTS SEG NFR BLD AUTO: 69 % (ref 43–75)
NEUTS SEG NFR BLD AUTO: 81 % (ref 43–75)
NRBC BLD AUTO-RTO: 0 /100 WBCS
NRBC BLD AUTO-RTO: 0 /100 WBCS
O2 CT VFR BLDCOA CALC: 5.7 ML/DL
OXYHGB MFR BLDCOA: 24.9 %
OXYHGB MFR BLDCOV: 36.5 %
PCO2 BLDCOA: 54.7 MM[HG] (ref 30–60)
PCO2 BLDCOV: 51 MM HG (ref 27–43)
PH BLDCOA: 7.25 [PH] (ref 7.23–7.43)
PH BLDCOV: 7.28 [PH] (ref 7.19–7.49)
PLATELET # BLD AUTO: 242 THOUSANDS/UL (ref 149–390)
PLATELET # BLD AUTO: 265 THOUSANDS/UL (ref 149–390)
PMV BLD AUTO: 10.5 FL (ref 8.9–12.7)
PMV BLD AUTO: 10.6 FL (ref 8.9–12.7)
PO2 BLDCOA: 15.1 MM HG (ref 5–25)
PO2 BLDCOV: 19.1 MM HG (ref 15–45)
PROTHROMBIN TIME: 13.5 SECONDS (ref 12.1–14.4)
RBC # BLD AUTO: 3.27 MILLION/UL (ref 3.81–5.12)
RBC # BLD AUTO: 3.5 MILLION/UL (ref 3.81–5.12)
RPR SER QL: NORMAL
SAO2 % BLDCOV: 8.3 ML/DL
WBC # BLD AUTO: 11.22 THOUSAND/UL (ref 4.31–10.16)
WBC # BLD AUTO: 6.76 THOUSAND/UL (ref 4.31–10.16)

## 2017-12-13 PROCEDURE — 86803 HEPATITIS C AB TEST: CPT | Performed by: OBSTETRICS & GYNECOLOGY

## 2017-12-13 PROCEDURE — 87340 HEPATITIS B SURFACE AG IA: CPT | Performed by: OBSTETRICS & GYNECOLOGY

## 2017-12-13 PROCEDURE — 85610 PROTHROMBIN TIME: CPT | Performed by: FAMILY MEDICINE

## 2017-12-13 PROCEDURE — 85730 THROMBOPLASTIN TIME PARTIAL: CPT | Performed by: FAMILY MEDICINE

## 2017-12-13 PROCEDURE — 87806 HIV AG W/HIV1&2 ANTB W/OPTIC: CPT | Performed by: OBSTETRICS & GYNECOLOGY

## 2017-12-13 PROCEDURE — 85025 COMPLETE CBC W/AUTO DIFF WBC: CPT | Performed by: FAMILY MEDICINE

## 2017-12-13 PROCEDURE — 30233N1 TRANSFUSION OF NONAUTOLOGOUS RED BLOOD CELLS INTO PERIPHERAL VEIN, PERCUTANEOUS APPROACH: ICD-10-PCS | Performed by: OBSTETRICS & GYNECOLOGY

## 2017-12-13 PROCEDURE — 85025 COMPLETE CBC W/AUTO DIFF WBC: CPT | Performed by: OBSTETRICS & GYNECOLOGY

## 2017-12-13 RX ORDER — SODIUM CHLORIDE, SODIUM LACTATE, POTASSIUM CHLORIDE, CALCIUM CHLORIDE 600; 310; 30; 20 MG/100ML; MG/100ML; MG/100ML; MG/100ML
125 INJECTION, SOLUTION INTRAVENOUS CONTINUOUS
Status: DISCONTINUED | OUTPATIENT
Start: 2017-12-13 | End: 2017-12-15 | Stop reason: HOSPADM

## 2017-12-13 RX ORDER — NALOXONE HYDROCHLORIDE 0.4 MG/ML
0.1 INJECTION, SOLUTION INTRAMUSCULAR; INTRAVENOUS; SUBCUTANEOUS
Status: ACTIVE | OUTPATIENT
Start: 2017-12-13 | End: 2017-12-14

## 2017-12-13 RX ORDER — NALBUPHINE HCL 10 MG/ML
5 AMPUL (ML) INJECTION
Status: DISPENSED | OUTPATIENT
Start: 2017-12-13 | End: 2017-12-14

## 2017-12-13 RX ORDER — SIMETHICONE 80 MG
80 TABLET,CHEWABLE ORAL EVERY 6 HOURS PRN
Status: DISCONTINUED | OUTPATIENT
Start: 2017-12-13 | End: 2017-12-15 | Stop reason: HOSPADM

## 2017-12-13 RX ORDER — DOCUSATE SODIUM 100 MG/1
100 CAPSULE, LIQUID FILLED ORAL 2 TIMES DAILY
Status: DISCONTINUED | OUTPATIENT
Start: 2017-12-13 | End: 2017-12-15 | Stop reason: HOSPADM

## 2017-12-13 RX ORDER — OXYCODONE HYDROCHLORIDE AND ACETAMINOPHEN 5; 325 MG/1; MG/1
1 TABLET ORAL EVERY 4 HOURS PRN
Status: DISCONTINUED | OUTPATIENT
Start: 2017-12-13 | End: 2017-12-15 | Stop reason: HOSPADM

## 2017-12-13 RX ORDER — DIPHENHYDRAMINE HYDROCHLORIDE 50 MG/ML
25 INJECTION INTRAMUSCULAR; INTRAVENOUS EVERY 6 HOURS PRN
Status: DISCONTINUED | OUTPATIENT
Start: 2017-12-13 | End: 2017-12-13

## 2017-12-13 RX ORDER — FENTANYL CITRATE/PF 50 MCG/ML
25 SYRINGE (ML) INJECTION
Status: DISCONTINUED | OUTPATIENT
Start: 2017-12-13 | End: 2017-12-15 | Stop reason: HOSPADM

## 2017-12-13 RX ORDER — ONDANSETRON 2 MG/ML
4 INJECTION INTRAMUSCULAR; INTRAVENOUS EVERY 4 HOURS PRN
Status: ACTIVE | OUTPATIENT
Start: 2017-12-13 | End: 2017-12-14

## 2017-12-13 RX ORDER — KETOROLAC TROMETHAMINE 30 MG/ML
30 INJECTION, SOLUTION INTRAMUSCULAR; INTRAVENOUS EVERY 6 HOURS
Status: COMPLETED | OUTPATIENT
Start: 2017-12-13 | End: 2017-12-13

## 2017-12-13 RX ORDER — DEXAMETHASONE SODIUM PHOSPHATE 4 MG/ML
8 INJECTION, SOLUTION INTRA-ARTICULAR; INTRALESIONAL; INTRAMUSCULAR; INTRAVENOUS; SOFT TISSUE ONCE AS NEEDED
Status: ACTIVE | OUTPATIENT
Start: 2017-12-13 | End: 2017-12-14

## 2017-12-13 RX ORDER — FERROUS SULFATE 325(65) MG
325 TABLET ORAL
Status: DISCONTINUED | OUTPATIENT
Start: 2017-12-13 | End: 2017-12-15 | Stop reason: HOSPADM

## 2017-12-13 RX ORDER — OXYTOCIN/RINGER'S LACTATE 30/500 ML
1-30 PLASTIC BAG, INJECTION (ML) INTRAVENOUS CONTINUOUS
Status: DISCONTINUED | OUTPATIENT
Start: 2017-12-13 | End: 2017-12-15 | Stop reason: HOSPADM

## 2017-12-13 RX ORDER — MAGNESIUM HYDROXIDE/ALUMINUM HYDROXICE/SIMETHICONE 120; 1200; 1200 MG/30ML; MG/30ML; MG/30ML
30 SUSPENSION ORAL ONCE
Status: COMPLETED | OUTPATIENT
Start: 2017-12-13 | End: 2017-12-13

## 2017-12-13 RX ORDER — METOCLOPRAMIDE HYDROCHLORIDE 5 MG/ML
5 INJECTION INTRAMUSCULAR; INTRAVENOUS EVERY 6 HOURS PRN
Status: ACTIVE | OUTPATIENT
Start: 2017-12-13 | End: 2017-12-14

## 2017-12-13 RX ORDER — ACETAMINOPHEN 325 MG/1
650 TABLET ORAL EVERY 6 HOURS PRN
Status: DISCONTINUED | OUTPATIENT
Start: 2017-12-13 | End: 2017-12-15 | Stop reason: HOSPADM

## 2017-12-13 RX ORDER — DIPHENHYDRAMINE HYDROCHLORIDE 50 MG/ML
25 INJECTION INTRAMUSCULAR; INTRAVENOUS EVERY 6 HOURS PRN
Status: ACTIVE | OUTPATIENT
Start: 2017-12-13 | End: 2017-12-14

## 2017-12-13 RX ORDER — OXYCODONE HYDROCHLORIDE AND ACETAMINOPHEN 5; 325 MG/1; MG/1
2 TABLET ORAL EVERY 4 HOURS PRN
Status: DISCONTINUED | OUTPATIENT
Start: 2017-12-13 | End: 2017-12-15 | Stop reason: HOSPADM

## 2017-12-13 RX ORDER — DIAPER,BRIEF,INFANT-TODD,DISP
1 EACH MISCELLANEOUS DAILY PRN
Status: DISCONTINUED | OUTPATIENT
Start: 2017-12-13 | End: 2017-12-15 | Stop reason: HOSPADM

## 2017-12-13 RX ORDER — ONDANSETRON 2 MG/ML
4 INJECTION INTRAMUSCULAR; INTRAVENOUS ONCE AS NEEDED
Status: DISCONTINUED | OUTPATIENT
Start: 2017-12-13 | End: 2017-12-15 | Stop reason: HOSPADM

## 2017-12-13 RX ORDER — IBUPROFEN 600 MG/1
600 TABLET ORAL EVERY 6 HOURS PRN
Status: DISCONTINUED | OUTPATIENT
Start: 2017-12-13 | End: 2017-12-15 | Stop reason: HOSPADM

## 2017-12-13 RX ADMIN — SIMETHICONE CHEW TAB 80 MG 80 MG: 80 TABLET ORAL at 15:56

## 2017-12-13 RX ADMIN — DOCUSATE SODIUM 100 MG: 100 CAPSULE, LIQUID FILLED ORAL at 18:36

## 2017-12-13 RX ADMIN — DOCUSATE SODIUM 100 MG: 100 CAPSULE, LIQUID FILLED ORAL at 09:11

## 2017-12-13 RX ADMIN — NALBUPHINE HYDROCHLORIDE 5 MG: 10 INJECTION, SOLUTION INTRAMUSCULAR; INTRAVENOUS; SUBCUTANEOUS at 02:14

## 2017-12-13 RX ADMIN — KETOROLAC TROMETHAMINE 30 MG: 30 INJECTION, SOLUTION INTRAMUSCULAR at 12:29

## 2017-12-13 RX ADMIN — KETOROLAC TROMETHAMINE 30 MG: 30 INJECTION, SOLUTION INTRAMUSCULAR at 06:24

## 2017-12-13 RX ADMIN — NALBUPHINE HYDROCHLORIDE 5 MG: 10 INJECTION, SOLUTION INTRAMUSCULAR; INTRAVENOUS; SUBCUTANEOUS at 12:30

## 2017-12-13 RX ADMIN — ALUMINUM HYDROXIDE, MAGNESIUM HYDROXIDE, AND SIMETHICONE 30 ML: 200; 200; 20 SUSPENSION ORAL at 06:42

## 2017-12-13 RX ADMIN — HYDROMORPHONE HYDROCHLORIDE 1 MG: 1 INJECTION, SOLUTION INTRAMUSCULAR; INTRAVENOUS; SUBCUTANEOUS at 03:13

## 2017-12-13 RX ADMIN — SODIUM CHLORIDE, SODIUM LACTATE, POTASSIUM CHLORIDE, AND CALCIUM CHLORIDE 125 ML/HR: .6; .31; .03; .02 INJECTION, SOLUTION INTRAVENOUS at 06:23

## 2017-12-13 RX ADMIN — NALBUPHINE HYDROCHLORIDE 5 MG: 10 INJECTION, SOLUTION INTRAMUSCULAR; INTRAVENOUS; SUBCUTANEOUS at 09:10

## 2017-12-13 RX ADMIN — SODIUM CHLORIDE, SODIUM LACTATE, POTASSIUM CHLORIDE, AND CALCIUM CHLORIDE: .6; .31; .03; .02 INJECTION, SOLUTION INTRAVENOUS at 00:25

## 2017-12-13 RX ADMIN — Medication 325 MG: at 12:32

## 2017-12-13 RX ADMIN — DIPHENHYDRAMINE HYDROCHLORIDE 25 MG: 50 INJECTION, SOLUTION INTRAMUSCULAR; INTRAVENOUS at 06:12

## 2017-12-13 RX ADMIN — KETOROLAC TROMETHAMINE 30 MG: 30 INJECTION, SOLUTION INTRAMUSCULAR at 00:25

## 2017-12-13 RX ADMIN — KETOROLAC TROMETHAMINE 30 MG: 30 INJECTION, SOLUTION INTRAMUSCULAR at 18:36

## 2017-12-13 RX ADMIN — AZITHROMYCIN MONOHYDRATE 250 MG: 500 INJECTION, POWDER, LYOPHILIZED, FOR SOLUTION INTRAVENOUS at 00:08

## 2017-12-13 NOTE — SOCIAL WORK
Consult for breast pump  Due to SANGITA SMITH pending insurance, RN aware to give hand pump for d/c  No other CM needs noted at this time

## 2017-12-13 NOTE — PLAN OF CARE
INFECTION - ADULT     Absence or prevention of progression during hospitalization Progressing     Absence of fever/infection during neutropenic period Progressing        Labor & Delivery     Patient vital signs are stable Progressing        POSTPARTUM     Experiences normal postpartum course Progressing     Appropriate maternal -  bonding Progressing     Establishment of infant feeding pattern Progressing     Incision(s), wounds(s) or drain site(s) healing without S/S of infection Progressing        SAFETY ADULT     Patient will remain free of falls Progressing     Maintain or return to baseline ADL function Progressing     Maintain or return mobility status to optimal level Progressing

## 2017-12-13 NOTE — ANESTHESIA PROCEDURE NOTES
Spinal Block    Patient location during procedure: OB  Start time: 12/12/2017 10:57 PM  End time: 12/12/2017 11:03 PM  Reason for block: procedure for pain, at surgeon's request and primary anesthetic  Staffing  Anesthesiologist: Codie Ness  Performed: anesthesiologist   Preanesthetic Checklist  Completed: patient identified, site marked, surgical consent, pre-op evaluation, timeout performed, IV checked, risks and benefits discussed and monitors and equipment checked  Spinal Block  Patient position: sitting  Prep: ChloraPrep  Patient monitoring: frequent blood pressure checks and continuous pulse ox  Approach: midline  Location: L3-4  Injection technique: single-shot  Needle  Needle type: pencil-tip   Needle gauge: 25 G  Needle length: 10 cm  Assessment  Sensory level: [id number] Assessment:  negative aspiration for heme, no paresthesia on injection and positive aspiration for clear CSF    Post-procedure:  adhesive bandage applied, pressure dressing applied, secured with tape, site cleaned and sterile dressing applied

## 2017-12-13 NOTE — ANESTHESIA POSTPROCEDURE EVALUATION
Post-Op Assessment Note      CV Status:  Stable    Mental Status:  Alert and awake    Hydration Status:  Euvolemic    PONV Controlled:  Controlled    Airway Patency:  Patent    Post Op Vitals Reviewed: Yes          Staff: Anesthesiologist           /68 (12/13/17 0043)    Temp (!) 97 4 °F (36 3 °C) (12/13/17 0043)    Pulse 99 (12/13/17 0043)   Resp 18 (12/13/17 0043)    SpO2 99 % (12/13/17 0043)

## 2017-12-13 NOTE — OB LABOR/OXYTOCIN SAFETY PROGRESS
Oxytocin Safety Progress Check Note - Natasha Ramírez Spring 44 y o  female MRN: [id number]    Unit/Bed#: -01 Encounter: [id number]    Obstetric History       T3      L3     SAB2   TAB0   Ectopic0   Multiple0   Live Births3      Gestational Age: 41w4d  Dose (carlos-units/min) Oxytocin: 9 carlos-units/min (okay to increase pitocin by 1 per Dr Taurus Perez)  Contraction Frequency (minutes): 1 5-3 5  Contraction Quality: Strong, Moderate  Tachysystole: No   Dilation: 3        Effacement (%): 50  Station: -2  Baseline Rate: 145 bpm  Fetal Heart Rate: 147 BPM  FHR Category: Category I          Notes/comments:   Spoke with the family and patient about how we would need to increase the Pitocin in order to effective cervical change  Patient at this moment in time is feeling the effects of state on states that her pain went from 10/10 to 5/10  She is okay with us increasing the Pitocin at this moment in time she is not on board for an epidural   I told her risk of  given the fact that she has been ruptured since 5 in the morning patient understands and would like to avoid that if possible  She will discuss with her  and see if an epidural as necessary if the 2 hour trial is unsuccessful            Kenji Jose MD 2017 8:18 PM

## 2017-12-13 NOTE — OP NOTE
OPERATIVE REPORT  PATIENT NAME: Gus Jimenez    :  1980  MRN: [id number]  Pt Location: [facility] L&D OR ROOM 02    SURGERY DATE: 2017 - 2017    Surgeon(s) and Role:     * Kristina Dior MD - Assisting     * Yojana Atkins MD - [address] [facility], DO - Primary    Preop Diagnosis:  Abnormal fetal heart rate [O76]    Post-Op Diagnosis Codes:     * Abnormal fetal heart rate [O76]    Procedure(s) (LRB):   SECTION () (N/A)    Specimen(s):  ID Type Source Tests Collected by Time Destination   1 :  Tissue (Placenta on Hold) OB Only Placenta TISSUE EXAM OB (PLACENTA) ONLY Yojana Atkins MD 2017 [id number]    A :  Cord Blood Cord BLOOD GAS, VENOUS, CORD, BLOOD GAS, ARTERIAL, CORD Yojana Atkins MD 2017 [id number]        Estimated Blood Loss:   600 mL    Drains:  Urethral Catheter 16 Fr  (Active)   Site Assessment Clean;Skin intact 2017 12:45 AM   Collection Container Standard drainage bag 2017 12:45 AM   Securement Method Securing device (Describe) 2017 12:45 AM   Number of days: 1       Anesthesia Type:   Spinal    Operative Indications:  Abnormal fetal heart rate [O76]    Operative Findings:  Viable female , weight 7lbs 9oz, APGARs 8/9  Friable Placenta with centrally inserted 3-V cord  Abnormally thickened endometrium  Thick, meconium stained fluid    Cord blood gasses:  Arterial pH: 7 247, Base excess: -4 8  Venous pH: 7 280, Base excess: -3 9    Complications:   None    Procedure and Technique:    Procedure Details   Decision was made to proceed with  section due to Category II FHM unresponsive to interventions and remote from delivery  Patient was made aware of these findings and the proposed plan  Risks, benefits, possible complications, alternate treatment options, and expected outcomes were discussed with the patient  The patient agreed with the proposed plan and gave informed consent        The patient was taken to the operating room where she was properly identified to the OR staff and attending physician  She  received spinal anesthesia by Dr Leann Estrella preoperatively  Fetal heart tones were appreciated and found to be appropriate  A Ram catheter and SCDs were in place  Betadine was used for vaginal prep  The abdomen was prepped with Chloraprep and following appropriate drying time, the patient was draped in the usual sterile manner for a Pfannenstiel incision  The patient had received Ancef 2g and 500mg Azithromycin IV pre-operatively for prophylaxis  A Time Out was held and the above information confirmed  [facility] procedure verified as  Delivery  A Pfannenstiel incision was made and carried down through the underlying subcutaneous tissue to the fascia using a scalpel  Rectus fascia was then incised in the midline and extended laterally using Givens scissors  The superior edge of the fascia was grasped with Kocher clamps, tented upward, and the underlying muscle was bluntly dissected off  The inferior edge was grasped with Kocher clamps and cleared in similar fashion  All anatomic layers were well-demarcated  The rectus muscles were  and the peritoneum was identified, tented up with hemostats, and subsequently entered sharply with metzenbaum scissors  The peritoneum was then extended longitudinally with blunt dissection  The bladder blade was inserted  A low transverse uterine incision was made with the scalpel and extended laterally with blunt dissection  Surgeons hand was inserted through the hysterotomy and the fetal head was palpated, elevated, and delivered through the uterine incision with the assistance of fundal pressure  Baby had spontaneous cry with good color and tone  The umbilical cord was clamped and cut  The infant was handed off to the  providers    Arterial and venous cord gases, cord blood, and a segment of umbilical cord were obtained for evaluation and promptly sent to the lab  The placenta was expressed with uterine fundal massage and was noted to have a centrally inserted 3 vessel cord  This was also sent to the lab for placental pathology  The uterus was unable to be exteriorized and a moist lap sponge was used to clear the cavity of clots and products of conception  The uterine incision was closed with a running locked suture of 0 Monocryl  A second layer of the same suture was used to imbricate the first  The incision site was noted to be non-hemostatic throughout the entire length of the hysterotomy  An attempt was made to control this with 3 figure of 8 sutures, but continue to be non-hemostatic  As such, the serosal layer was closed with 0-Monocryl  Good hemostasis was confirmed upon uterine closure  The paracolic gutters were inspected and cleared of all clots and debris with suction  The peritoneum was re-approximated with running suture of 3-0 Plain  The fascia was closed with a running suture of 0 Vicryl  The skin was closed with 4-0 Monocryl in a subcuticular fashion  Histacryl was applied and an abdominal binder was then placed  At the conclusion of the procedure, all needle, sponge, and instrument counts were noted to be correct x2  The patient tolerated the procedure well and was transferred to her the recovery room in stable condition  Otilia Sal DO was present and participated in all key portions of the case        Patient Disposition:  PACU     SIGNATURE: Nrei Mason MD  DATE: December 13, 2017  TIME: 1:30 AM

## 2017-12-13 NOTE — OB LABOR/OXYTOCIN SAFETY PROGRESS
Oxytocin Safety Progress Check Note - Natasha Dennison 44 y o  female MRN: [id number]    Unit/Bed#: -01 Encounter: [id number]    Obstetric History       T3      L3     SAB2   TAB0   Ectopic0   Multiple0   Live Births3      Gestational Age: 41w4d  Dose (carlos-units/min) Oxytocin: 0 carlos-units/min (c-section called)  Contraction Frequency (minutes): 1 5-3  Contraction Quality: Strong, Moderate  Tachysystole: No   Dilation: 2        Effacement (%): 50  Station: -3  Baseline Rate: 125 bpm  Fetal Heart Rate: 125 BPM  FHR Category: Category II          Notes/comments: To bedside to review with patient her FHT thus far  Reviewed various decelerations noted (early, variables, and now lates) which do not resolve with interventions  Losing variability (now minimal) without reactivity  Reviewed tried amnioinfusion, repositioning, fluid bolus  Reviewed recommendation for  delivery, given remote from delivery and unable to resolve these changes  Reviewed risks/benefits of  section  Questions answered to patient's satisfaction            Dorothea Boyd MD 2017 10:38 PM

## 2017-12-13 NOTE — DISCHARGE SUMMARY
Discharge Summary - Jessie 44 y o  female MRN: [id number]    Unit/Bed#: -46 Encounter: [id number]    Admission Date: 2017     Discharge Date: 12/15/17    Admitting Diagnosis:   1  Pregnancy at 40w2d  2  PROM  3  Pre-eclampsia without severe features  4  Induction of Labor  5  NRFHT    Discharge Diagnosis: same, delivered    Procedures: primary  section, low transverse incision    Delivering Attending: Dr Sarbjit Gallo    Discharging Attending: Dr Pat Angelucci:     Jessie is a 44 y o  [id number] at 40w2d wks who was initially admitted for IOL for PROM  Pt was noted to have elevated blood pressures and subsequently had pre-eclamptic labs drawn that were noted to have a urine protein/creatinine ratio of 0 64, providing the diagnosis of pre-eclampsia without severe features  She was started on penicillin for GBS ppx and pitocin  She received 1mg Stadol for analgesia  FHM  Demonstrated variable decelerations that included early, variables, and late decels that did not respond to interventions  Discussion was made with recommendation to proceed with primary  section based on NRFHT  Pt was in agreement, considering remote from delivery and unable to resolve these changes  She delivered a viable female  on 17 at 2323  Weight 7 lbs 8oz via primary  section, low transverse incision  Apgars were 8 (1 min) and 9 (5 min)   was transferred to  nursery  Patient tolerated the procedure well and was transferred to recovery in stable condition  Her post-operative course was not complicated  Preoperative hemaglobin was 10 9, postoperative was 8 4, then 5 9 secondary to increased postpartum lochia  Was given 2 units of PRBC, Hb trending up to 9 4 and stable at 8 8, asymptomatic, BP stable and no tachycardiac  Her postoperative pain was well controlled with oral analgesics      On day of discharge, she was ambulating and able to reasonably perform all ADLs  She was voiding and had appropriate bowel function  Pain was well controlled  She was discharged home on post-operative day #3 without complications  Patient was instructed to follow up with her OB as an outpatient and was given appropriate warnings to call provider if she develops signs of infection or uncontrolled pain  Complications: none apparent    Condition at discharge: good     Discharge instructions/Information to patient and family:   See after visit summary for information provided to patient and family  Provisions for Follow-Up Care:  See after visit summary for information related to follow-up care and any pertinent home health orders        Disposition: Home    Planned Readmission: No

## 2017-12-13 NOTE — PROGRESS NOTES
Progress Note - OB/GYN   Natasha jo ann Goldman 44 y o  female MRN: [id number]  Unit/Bed#:  318-01 Encounter: [id number]    Assessment:   Post Op Day #1 s/p 1LTCS, stable    Plan:  1  Continue routine post partum care   -Encourage ambulation   -Encourage breastfeeding  2  Pre w/o Sf:    Bps have been 110-120s/60-80s, asymptomatic   -Continue to monitor bps and sxs  3  Hb: 10 9 -> 9 4 -> 8 4, patient is asymptomatic  4  Urine output ~100cc/ hr  D/C glass at noon  F/u voiding trial         Subjective/Objective   Chief Complaint:     Post delivery    Subjective:     Pain: yes, cramping, improved with meds  Tolerating PO: yes  Voiding: yes  Flatus: yes  BM: no  Ambulating: yes  Breastfeeding:  Yes  Chest pain: no  Shortness of breath: no  Leg pain: no  Lochia: minimal    Objective:     Vitals: /60   Pulse 99   Temp 98 2 °F (36 8 °C) (Oral)   Resp 18   Ht 5' 4" (1 626 m)   Wt 73 5 kg (162 lb)   LMP 03/05/2017   SpO2 99%   Breastfeeding?  Yes   BMI 27 81 kg/m²       Intake/Output Summary (Last 24 hours) at 12/13/17 0641  Last data filed at 12/13/17 0600   Gross per 24 hour   Intake             4500 ml   Output             1950 ml   Net             2550 ml       Lab Results   Component Value Date    WBC 11 22 (H) 12/13/2017    HGB 8 4 (L) 12/13/2017    HCT 27 3 (L) 12/13/2017    MCV 84 12/13/2017     12/13/2017       Current Facility-Administered Medications   Medication Dose Route Frequency    acetaminophen (TYLENOL) tablet 650 mg  650 mg Oral Q6H PRN    aluminum-magnesium hydroxide-simethicone (MYLANTA) 200-200-20 mg/5 mL oral suspension 30 mL  30 mL Oral Once    benzocaine-menthol-lanolin-aloe (DERMOPLAST) 20-0 5 % topical spray 1 application  1 application Topical O5V PRN    dexamethasone (DECADRON) injection 8 mg  8 mg Intravenous Once PRN    diphenhydrAMINE (BENADRYL) injection 25 mg  25 mg Intravenous Q6H PRN    docusate sodium (COLACE) capsule 100 mg  100 mg Oral BID    fentaNYL (SUBLIMAZE) injection 25 mcg  25 mcg Intravenous Q3 min PRN    hydrocortisone 1 % cream 1 application  1 application Topical Daily PRN    HYDROmorphone (DILAUDID) 1 mg/mL injection 0 5 mg  0 5 mg Intravenous Q5 Min PRN    HYDROmorphone (DILAUDID) 1 mg/mL injection 1 mg  1 mg Intravenous Q2H PRN    ibuprofen (MOTRIN) tablet 600 mg  600 mg Oral Q6H PRN    ketorolac (TORADOL) injection 30 mg  30 mg Intravenous Q6H    lactated ringers infusion  125 mL/hr Intravenous Continuous    metoclopramide (REGLAN) injection 5 mg  5 mg Intravenous Q6H PRN    nalbuphine (NUBAIN) injection 5 mg  5 mg Intravenous Q3H PRN    naloxone (NARCAN) injection 0 1 mg  0 1 mg Intravenous Q3 min PRN    ondansetron (ZOFRAN) injection 4 mg  4 mg Intravenous Q4H PRN    ondansetron (ZOFRAN) injection 4 mg  4 mg Intravenous Once PRN    oxyCODONE-acetaminophen (PERCOCET) 5-325 mg per tablet 1 tablet  1 tablet Oral Q4H PRN    oxyCODONE-acetaminophen (PERCOCET) 5-325 mg per tablet 2 tablet  2 tablet Oral Q4H PRN    oxytocin (PITOCIN) 30 Units in lactated ringers 500 mL infusion  1-30 carlos-units/min Intravenous Continuous    witch hazel-glycerin (TUCKS) topical pad 1 pad  1 pad Topical Q4H PRN       Physical Exam:     Gen: AAOx3, NAD  CV: RRR  Lungs: CTA b/l  Abd: Soft, non-tender, non-distended, no rebound or guarding  Uterine fundus firm and non-tender, at umbilicus, incisions are c/d/i  Ext: Non tender    Juan Samuel, PGY-1  OB/GYN  12/13/2017  6:41 AM

## 2017-12-13 NOTE — ANESTHESIA PREPROCEDURE EVALUATION
Review of Systems/Medical History  Patient summary reviewed        Cardiovascular  Negative cardio ROS    Pulmonary  Negative pulmonary ROS ,        GI/Hepatic  Negative GI/hepatic ROS   GERD ,        Negative  ROS        Endo/Other  Negative endo/other ROS      GYN  Negative gynecology ROS          Hematology  Negative hematology ROS      Musculoskeletal  Negative musculoskeletal ROS        Neurology  Negative neurology ROS      Psychology   Negative psychology ROS            Physical Exam    Airway    Mallampati score: II  TM Distance: >3 FB  Neck ROM: full     Dental       Cardiovascular  Comment: Negative ROS, Cardiovascular exam normal    Pulmonary  Pulmonary exam normal     Other Findings        Anesthesia Plan  ASA Score- 2 Emergent      Anesthesia Type- spinal with ASA Monitors  Additional Monitors:   Airway Plan:           Induction- intravenous  Informed Consent- Anesthetic plan and risks discussed with patient  I personally reviewed this patient with the CRNA  Discussed and agreed on the Anesthesia Plan with the CRNA  Catherine Navas

## 2017-12-13 NOTE — DISCHARGE INSTRUCTIONS
Cesárea   LO QUE NECESITA SABER:   Kennedy cesárea o parto por cesárea es kennedy cirugía abdominal que se realiza para extraer a tenorio bebé  INSTRUCCIONES SOBRE EL NARCISA HOSPITALARIA:   Llame al 911 en vidal de presentar lo siguiente:   · Usted se siente mareada, le hace falta el aire y tiene dolor de [place]  · Usted expectora gloria  Busque atención médica de inmediato si:   · La gloria empapa el vendaje  · Se desprenden los puntos de sutura  · Tenorio brazo o pierna se siente caliente, sensible y [place]  Se podría ramesh inflamado y ray  Comuníquese con tenorio obstetra si:   · Usted presenta sangrado vaginal que empapa 1 toalla higiénica o más en 1 hora  · Usted tiene fiebre  · Tenorio incisión está inflamada, enrojecida o drena pus  · Usted tiene preguntas o inquietudes acerca de usted o de tenorio bebé  Medicamentos:  Es posible que usted necesite alguno de los siguientes:  · Un medicamento con receta para el dolor  podrían ser [place]  Pregunte cómo evaristo estos medicamentos de kennedy forma jonas  · El acetaminofén  Kissousa el dolor y baja la fiebre  Está disponible sin receta médica  Pregunte la cantidad y la frecuencia con que debe tomarlos  Rupinder 645  El acetaminofén puede causar daño en el hígado cuando no se cricket de forma correcta  · AINEs (Analgésicos antiinflamatorios no esteroides) loreta el ibuprofeno, ayudan a disminuir la inflamación, el dolor y la [place]  Los AINEs pueden causar sangrado estomacal o problemas renales en ciertas personas  Si usted cricket un medicamento anticoagulante, siempre pregúntele a tenorio médico si los BRIEN son seguros para usted  Siempre vianney la etiqueta de jsuto medicamento y [place] instrucciones  · [place] julia medicamentos loreta se le haya indicado  Consulte con tenorio médico si usted sonja que tenorio medicamento no le está ayudando o si presenta efectos secundarios  Infórmele si es alérgico a cualquier medicamento   Mantenga kennedy lista actualizada de los [facility], las vitaminas y los productos herbales que cricket  Incluya los siguientes datos de los medicamentos: cantidad, frecuencia y motivo de administración  Traiga con usted la lista o los envases de la píldoras a julia citas de seguimiento  Lleve la lista de los medicamentos con usted en vidal de kennedy emergencia  Cuidado de la herida:  Mike Katie cuidadosamente tenorio herida con Toni Bound y agua cada día  Mantenga la herida limpia y seca  Use ropa suelta y cómoda que no le roce tenorio herida  Pregunte a tenorio ginecólogo acerca de bañarse y ducharse  Limite julia actividades según le indicaron:   · Pregunte cuándo es seguro para que usted Jaiden Llanos, suba las escaleras, levante objetos pesados y tenga relaciones sexuales  · Pregunte cuándo podrá hacer ejercicio y qué tipos de ejercicio hacer  Comience lentamente y suzanna más conforme esté más efrne  [place] líquidos según julia indicaciones:  Los líquidos la ayudan a mantenerse hidratada después de tenorio procedimiento y disminuye tenorio riesgo de un coágulo de gloria  Pregunte cuánto líquido debe evaristo cada día y cuáles líquidos son los más adecuados para usted  Programe kennedy chan con tenorio ginecólogo loreta se le indique:  Es posible que usted necesite regresar para que le quiten los puntos de sutura o las grapas  Anote julia preguntas para que se acuerde de hacerlas aida juila visitas  © 2017 [address] Information is for End User's use only and may not be sold, redistributed or otherwise used for commercial purposes  All illustrations and images included in CareNotes® are the copyrighted property of A D A M , Inc  or Erlin Conenr  Esta información es sólo para uso en educación  Tenorio intención no es darle un consejo médico sobre enfermedades o tratamientos  Colsulte con tenorio Sloane Boers farmacéutico antes de seguir cualquier régimen médico para saber si es seguro y efectivo para usted

## 2017-12-13 NOTE — PROGRESS NOTES
Postpartum Progress Note       PROCEDURE: Primary Low  Delivery    SUBJECTIVE:    Pain: yes    Tolerating Oral Intake: yes     Voiding: yes    Flatus: yes    Bowel Movement: no    Ambulating: yes    Breastfeeding: no    Chest Pain: no    Shortness of Breath: no    Leg Pain/Discomfort: no    Lochia: moderate    Other: itching all over her body starting after C-sec, no rash  No cough, palpitation  Also, c/o stomach discomfort and mild distension      OBJECTIVE:    General: No Acute Distress     Cardiovascular: Regular, Rate and Rhythm, no murmur, gallop or rub     Lungs: Clear to Auscultation Bilaterally, no wheezing, rhonchi or rales     Abdomen: Soft, non-distended, non-tender, no rebound, guarding or CVA tenderness  BS (+)    Fundus: Firm & Non-Tender     Fundal Location: At the umbilicus  Need incision check    Lower Extremities: Non-Tender, Homans' sign (-) B/L    Skin: some scratches over extremities  No rash      Vitals:    17 0500   BP: 121/60   Pulse: 99   Resp: 18   Temp: 98 2 °F (36 8 °C)   SpO2: 99%         Results from last 7 days  Lab Units 17  0530 17  0004 17  1059   WBC Thousand/uL 11 22* 6 76 5 96   HEMOGLOBIN g/dL 8 4* 9 4* 10 9*   HEMATOCRIT % 27 3* 28 7* 33 7*   PLATELETS Thousands/uL 242 265 292   NEUTROS PCT % 81* 69 61   MONOS PCT % 7 8 10       Results from last 7 days  Lab Units 17  1714   SODIUM mmol/L 133*   POTASSIUM mmol/L 4 4   CHLORIDE mmol/L 103   CO2 mmol/L 21   BUN mg/dL 7   CREATININE mg/dL 0 55*   CALCIUM mg/dL 8 9   TOTAL PROTEIN g/dL 8 0   BILIRUBIN TOTAL mg/dL 0 39   ALK PHOS U/L 216*   ALT U/L 13   AST U/L 19   GLUCOSE RANDOM mg/dL 52*         No results found for: PHOS     Results from last 7 days  Lab Units 17  0004   INR  1 03   PTT seconds 22*         No results found for: TROPONINI  ABG:No results found for: PHART, LCJ9HGL, PO2ART, NLB1JYY, V0HXBLAN, BEART, SOURCE  LABS / TESTS / MEDICATION:      Admission on 2017 Component Date Value    ABO Grouping 12/12/2017 O     Rh Factor 12/12/2017 Positive     Antibody Screen 12/12/2017 Negative     Specimen Expiration Date 12/12/2017 [id number]     WBC 12/12/2017 5 96     RBC 12/12/2017 4 06     Hemoglobin 12/12/2017 10 9*    Hematocrit 12/12/2017 33 7*    MCV 12/12/2017 83     MCH 12/12/2017 26 8     MCHC 12/12/2017 32 3     RDW 12/12/2017 15 6*    MPV 12/12/2017 10 9     Platelets 28/01/0143 292     nRBC 12/12/2017 0     Neutrophils Relative 12/12/2017 61     Lymphocytes Relative 12/12/2017 27     Monocytes Relative 12/12/2017 10     Eosinophils Relative 12/12/2017 2     Basophils Relative 12/12/2017 0     Neutrophils Absolute 12/12/2017 3 63     Lymphocytes Absolute 12/12/2017 1 62     Monocytes Absolute 12/12/2017 0 58     Eosinophils Absolute 12/12/2017 0 10     Basophils Absolute 12/12/2017 0 01     Sodium 12/12/2017 133*    Potassium 12/12/2017 4 4     Chloride 12/12/2017 103     CO2 12/12/2017 21     Anion Gap 12/12/2017 9     BUN 12/12/2017 7     Creatinine 12/12/2017 0 55*    Glucose 12/12/2017 52*    Calcium 12/12/2017 8 9     AST 12/12/2017 19     ALT 12/12/2017 13     Alkaline Phosphatase 12/12/2017 216*    Total Protein 12/12/2017 8 0     Albumin 12/12/2017 2 6*    Total Bilirubin 12/12/2017 0 39     eGFR 12/12/2017 119     Clarity, UA 12/12/2017 Clear     Color, UA 12/12/2017 Yellow     Specific Gravity, UA 12/12/2017 1 010     pH, UA 12/12/2017 6 5     Glucose, UA 12/12/2017 Negative     Ketones, UA 12/12/2017 Negative     Blood, UA 12/12/2017 Negative     Protein, UA 12/12/2017 Negative     Nitrite, UA 12/12/2017 Negative     Bilirubin, UA 12/12/2017 Negative     Urobilinogen, UA 12/12/2017 0 2     Leukocytes, UA 12/12/2017 Negative     WBC, UA 12/12/2017 None Seen     RBC, UA 12/12/2017 None Seen     Hyaline Casts, UA 12/12/2017 None Seen     Bacteria, UA 12/12/2017 None Seen     Epithelial Cells 12/12/2017 None Seen     Creatinine, Ur 12/12/2017 29 6     Protein Urine Random 12/12/2017 19     Prot/Creat Ratio, Ur 12/12/2017 0 64*    pH, Cord Patel 12/13/2017 7 280     pCO2, Cord Patel 12/13/2017 51 0*    pO2, Cord Patel 12/13/2017 19 1     HCO3, Cord Patel 12/13/2017 23 4     Base Exc, Cord Patel 12/13/2017 -3 9*    O2 Cont, Cord Patel 12/13/2017 8 3     O2 HGB,VENOUS CORD 12/13/2017 36 5     pH, Cord Art 12/13/2017 7  247     pCO2, Cord Art 12/13/2017 54 7     pO2, Cord Art 12/13/2017 15 1     HCO3, Cord Art 12/13/2017 23 3     Base Exc, Cord Art 12/13/2017 -4 8*    O2 Content, Cord Art 12/13/2017 5 7     O2 Hgb, Arterial Cord 12/13/2017 24 9     WBC 12/13/2017 6 76     RBC 12/13/2017 3 50*    Hemoglobin 12/13/2017 9 4*    Hematocrit 12/13/2017 28 7*    MCV 12/13/2017 82     MCH 12/13/2017 26 9     MCHC 12/13/2017 32 8     RDW 12/13/2017 15 9*    MPV 12/13/2017 10 6     Platelets 59/49/8374 265     nRBC 12/13/2017 0     Neutrophils Relative 12/13/2017 69     Lymphocytes Relative 12/13/2017 22     Monocytes Relative 12/13/2017 8     Eosinophils Relative 12/13/2017 1     Basophils Relative 12/13/2017 0     Neutrophils Absolute 12/13/2017 4 67     Lymphocytes Absolute 12/13/2017 1 48     Monocytes Absolute 12/13/2017 0 53     Eosinophils Absolute 12/13/2017 0 06     Basophils Absolute 12/13/2017 0 00     Protime 12/13/2017 13 5     INR 12/13/2017 1 03     PTT 12/13/2017 22*    Unit Product Code 12/13/2017 T2368O43     Unit Number 12/13/2017 [id number]     Unit ABO 12/13/2017 O     Unit RH 12/13/2017 POS     Crossmatch 12/13/2017 Compatible     Unit Dispense Status 12/13/2017 Crossmatched     Unit Product Code 12/13/2017 U5075Z81     Unit Number 12/13/2017 [id number]     Unit ABO 12/13/2017 O     Unit RH 12/13/2017 POS     Crossmatch 12/13/2017 Compatible     Unit Dispense Status 12/13/2017 Crossmatched     WBC 12/13/2017 11 22*    RBC 12/13/2017 3 27*    Hemoglobin 12/13/2017 8 4*    Hematocrit 12/13/2017 27 3*    MCV 12/13/2017 84     MCH 12/13/2017 25 7*    MCHC 12/13/2017 30 8*    RDW 12/13/2017 15 8*    MPV 12/13/2017 10 5     Platelets 22/75/3867 242     nRBC 12/13/2017 0     Neutrophils Relative 12/13/2017 81*    Lymphocytes Relative 12/13/2017 12*    Monocytes Relative 12/13/2017 7     Eosinophils Relative 12/13/2017 0     Basophils Relative 12/13/2017 0     Neutrophils Absolute 12/13/2017 8 98*    Lymphocytes Absolute 12/13/2017 1 32     Monocytes Absolute 12/13/2017 0 82     Eosinophils Absolute 12/13/2017 0 05     Basophils Absolute 12/13/2017 0 01        Current Facility-Administered Medications   Medication Dose Route Frequency    acetaminophen (TYLENOL) tablet 650 mg  650 mg Oral Q6H PRN    benzocaine-menthol-lanolin-aloe (DERMOPLAST) 20-0 5 % topical spray 1 application  1 application Topical S8J PRN    dexamethasone (DECADRON) injection 8 mg  8 mg Intravenous Once PRN    diphenhydrAMINE (BENADRYL) injection 25 mg  25 mg Intravenous Q6H PRN    docusate sodium (COLACE) capsule 100 mg  100 mg Oral BID    fentaNYL (SUBLIMAZE) injection 25 mcg  25 mcg Intravenous Q3 min PRN    hydrocortisone 1 % cream 1 application  1 application Topical Daily PRN    HYDROmorphone (DILAUDID) 1 mg/mL injection 0 5 mg  0 5 mg Intravenous Q5 Min PRN    HYDROmorphone (DILAUDID) 1 mg/mL injection 1 mg  1 mg Intravenous Q2H PRN    ibuprofen (MOTRIN) tablet 600 mg  600 mg Oral Q6H PRN    ketorolac (TORADOL) injection 30 mg  30 mg Intravenous Q6H    lactated ringers infusion  125 mL/hr Intravenous Continuous    metoclopramide (REGLAN) injection 5 mg  5 mg Intravenous Q6H PRN    nalbuphine (NUBAIN) injection 5 mg  5 mg Intravenous Q3H PRN    naloxone (NARCAN) injection 0 1 mg  0 1 mg Intravenous Q3 min PRN    ondansetron (ZOFRAN) injection 4 mg  4 mg Intravenous Q4H PRN    ondansetron (ZOFRAN) injection 4 mg  4 mg Intravenous Once PRN    oxyCODONE-acetaminophen (PERCOCET) 5-325 mg per tablet 1 tablet  1 tablet Oral Q4H PRN    oxyCODONE-acetaminophen (PERCOCET) 5-325 mg per tablet 2 tablet  2 tablet Oral Q4H PRN    oxytocin (PITOCIN) 30 Units in lactated ringers 500 mL infusion  1-30 carlos-units/min Intravenous Continuous    witch hazel-glycerin (TUCKS) topical pad 1 pad  1 pad Topical Q4H PRN       ASSESSMENT AND PLAN:   Postop day 1;   Status post: Primary Low  Delivery  1  Continue Routine Postpartum Care  2  Encourage Ambulation  3  Advance diet as tolerated  4  Plan Contraception discussed: unsure plan of contraception now, will follow up at the point of discharge  5  Pruritus post-op:  - no respiratory compromise  - most likely due to reaction to Morphine given intraop  --> Benadryl 25 mg IV Q6 PRN  6  Stomach discomfort:  --> Mylanta 30 mL PO x 1  7   Needle stick intraop:   --> awaiting Exposure Panel       Signature/Title: Jaya Dumas MD  Date: 2017  Time: 6:12 AM

## 2017-12-13 NOTE — PLAN OF CARE
BIRTH - VAGINAL/ SECTION     Fetal and maternal status remain reassuring during the birth process Completed     Emotionally satisfying birthing experience for mother/fetus Completed        Labor & Delivery     Manages discomfort Completed          INFECTION - ADULT     Absence or prevention of progression during hospitalization Progressing     Absence of fever/infection during neutropenic period Progressing        Labor & Delivery     Patient vital signs are stable Progressing        POSTPARTUM     Experiences normal postpartum course Progressing     Appropriate maternal -  bonding Progressing     Establishment of infant feeding pattern Progressing     Incision(s), wounds(s) or drain site(s) healing without S/S of infection Progressing        SAFETY ADULT     Patient will remain free of falls Progressing     Maintain or return to baseline ADL function Progressing     Maintain or return mobility status to optimal level Progressing

## 2017-12-13 NOTE — PLAN OF CARE
Knowledge Deficit     Verbalizes understanding of labor plan Completed          INFECTION - ADULT     Absence or prevention of progression during hospitalization Progressing     Absence of fever/infection during neutropenic period Progressing        Labor & Delivery     Manages discomfort Progressing     Patient vital signs are stable Progressing        SAFETY ADULT     Patient will remain free of falls Progressing     Maintain or return to baseline ADL function Progressing     Maintain or return mobility status to optimal level Progressing

## 2017-12-14 LAB
BASOPHILS # BLD AUTO: 0.01 THOUSANDS/ΜL (ref 0–0.1)
BASOPHILS # BLD MANUAL: 0 THOUSAND/UL (ref 0–0.1)
BASOPHILS NFR BLD AUTO: 0 % (ref 0–1)
BASOPHILS NFR MAR MANUAL: 0 % (ref 0–1)
EOSINOPHIL # BLD AUTO: 0.13 THOUSAND/ΜL (ref 0–0.61)
EOSINOPHIL # BLD MANUAL: 0.29 THOUSAND/UL (ref 0–0.4)
EOSINOPHIL NFR BLD AUTO: 2 % (ref 0–6)
EOSINOPHIL NFR BLD MANUAL: 4 % (ref 0–6)
ERYTHROCYTE [DISTWIDTH] IN BLOOD BY AUTOMATED COUNT: 15.2 % (ref 11.6–15.1)
ERYTHROCYTE [DISTWIDTH] IN BLOOD BY AUTOMATED COUNT: 16 % (ref 11.6–15.1)
ERYTHROCYTE [DISTWIDTH] IN BLOOD BY AUTOMATED COUNT: 16 % (ref 11.6–15.1)
HCT VFR BLD AUTO: 18.3 % (ref 34.8–46.1)
HCT VFR BLD AUTO: 18.8 % (ref 34.8–46.1)
HCT VFR BLD AUTO: 28.6 % (ref 34.8–46.1)
HGB BLD-MCNC: 5.9 G/DL (ref 11.5–15.4)
HGB BLD-MCNC: 5.9 G/DL (ref 11.5–15.4)
HGB BLD-MCNC: 9.4 G/DL (ref 11.5–15.4)
LYMPHOCYTES # BLD AUTO: 1.1 THOUSAND/UL (ref 0.6–4.47)
LYMPHOCYTES # BLD AUTO: 1.73 THOUSANDS/ΜL (ref 0.6–4.47)
LYMPHOCYTES # BLD AUTO: 15 % (ref 14–44)
LYMPHOCYTES NFR BLD AUTO: 23 % (ref 14–44)
MCH RBC QN AUTO: 26.1 PG (ref 26.8–34.3)
MCH RBC QN AUTO: 26.5 PG (ref 26.8–34.3)
MCH RBC QN AUTO: 27.5 PG (ref 26.8–34.3)
MCHC RBC AUTO-ENTMCNC: 31.4 G/DL (ref 31.4–37.4)
MCHC RBC AUTO-ENTMCNC: 32.2 G/DL (ref 31.4–37.4)
MCHC RBC AUTO-ENTMCNC: 32.9 G/DL (ref 31.4–37.4)
MCV RBC AUTO: 82 FL (ref 82–98)
MCV RBC AUTO: 83 FL (ref 82–98)
MCV RBC AUTO: 84 FL (ref 82–98)
MONOCYTES # BLD AUTO: 0.15 THOUSAND/UL (ref 0–1.22)
MONOCYTES # BLD AUTO: 0.57 THOUSAND/ΜL (ref 0.17–1.22)
MONOCYTES NFR BLD AUTO: 8 % (ref 4–12)
MONOCYTES NFR BLD: 2 % (ref 4–12)
NEUTROPHILS # BLD AUTO: 4.99 THOUSANDS/ΜL (ref 1.85–7.62)
NEUTROPHILS # BLD MANUAL: 5.77 THOUSAND/UL (ref 1.85–7.62)
NEUTS SEG NFR BLD AUTO: 67 % (ref 43–75)
NEUTS SEG NFR BLD AUTO: 79 % (ref 43–75)
NRBC BLD AUTO-RTO: 0 /100 WBCS
NRBC BLD AUTO-RTO: 0 /100 WBCS
PLATELET # BLD AUTO: 167 THOUSANDS/UL (ref 149–390)
PLATELET # BLD AUTO: 171 THOUSANDS/UL (ref 149–390)
PLATELET # BLD AUTO: 229 THOUSANDS/UL (ref 149–390)
PLATELET BLD QL SMEAR: ADEQUATE
PMV BLD AUTO: 9.4 FL (ref 8.9–12.7)
PMV BLD AUTO: 9.9 FL (ref 8.9–12.7)
PMV BLD AUTO: 9.9 FL (ref 8.9–12.7)
RBC # BLD AUTO: 2.23 MILLION/UL (ref 3.81–5.12)
RBC # BLD AUTO: 2.26 MILLION/UL (ref 3.81–5.12)
RBC # BLD AUTO: 3.42 MILLION/UL (ref 3.81–5.12)
RBC MORPH BLD: NORMAL
WBC # BLD AUTO: 10.72 THOUSAND/UL (ref 4.31–10.16)
WBC # BLD AUTO: 7.3 THOUSAND/UL (ref 4.31–10.16)
WBC # BLD AUTO: 7.45 THOUSAND/UL (ref 4.31–10.16)

## 2017-12-14 PROCEDURE — 85025 COMPLETE CBC W/AUTO DIFF WBC: CPT | Performed by: FAMILY MEDICINE

## 2017-12-14 PROCEDURE — P9021 RED BLOOD CELLS UNIT: HCPCS

## 2017-12-14 PROCEDURE — 85007 BL SMEAR W/DIFF WBC COUNT: CPT | Performed by: OBSTETRICS & GYNECOLOGY

## 2017-12-14 PROCEDURE — 90715 TDAP VACCINE 7 YRS/> IM: CPT | Performed by: OBSTETRICS & GYNECOLOGY

## 2017-12-14 PROCEDURE — 85027 COMPLETE CBC AUTOMATED: CPT | Performed by: OBSTETRICS & GYNECOLOGY

## 2017-12-14 RX ORDER — ACETAMINOPHEN 325 MG/1
650 TABLET ORAL ONCE
Status: COMPLETED | OUTPATIENT
Start: 2017-12-14 | End: 2017-12-14

## 2017-12-14 RX ORDER — DIPHENHYDRAMINE HYDROCHLORIDE 50 MG/ML
25 INJECTION INTRAMUSCULAR; INTRAVENOUS ONCE
Status: COMPLETED | OUTPATIENT
Start: 2017-12-14 | End: 2017-12-14

## 2017-12-14 RX ADMIN — OXYCODONE HYDROCHLORIDE AND ACETAMINOPHEN 2 TABLET: 5; 325 TABLET ORAL at 22:15

## 2017-12-14 RX ADMIN — DOCUSATE SODIUM 100 MG: 100 CAPSULE, LIQUID FILLED ORAL at 09:53

## 2017-12-14 RX ADMIN — OXYCODONE HYDROCHLORIDE AND ACETAMINOPHEN 2 TABLET: 5; 325 TABLET ORAL at 00:25

## 2017-12-14 RX ADMIN — Medication 325 MG: at 09:53

## 2017-12-14 RX ADMIN — OXYCODONE HYDROCHLORIDE AND ACETAMINOPHEN 2 TABLET: 5; 325 TABLET ORAL at 09:52

## 2017-12-14 RX ADMIN — SIMETHICONE CHEW TAB 80 MG 80 MG: 80 TABLET ORAL at 09:53

## 2017-12-14 RX ADMIN — ACETAMINOPHEN 650 MG: 325 TABLET, FILM COATED ORAL at 05:43

## 2017-12-14 RX ADMIN — OXYCODONE HYDROCHLORIDE AND ACETAMINOPHEN 2 TABLET: 5; 325 TABLET ORAL at 17:15

## 2017-12-14 RX ADMIN — DIPHENHYDRAMINE HYDROCHLORIDE 25 MG: 50 INJECTION, SOLUTION INTRAMUSCULAR; INTRAVENOUS at 05:44

## 2017-12-14 RX ADMIN — TETANUS TOXOID, REDUCED DIPHTHERIA TOXOID AND ACELLULAR PERTUSSIS VACCINE, ADSORBED 0.5 ML: 5; 2.5; 8; 8; 2.5 SUSPENSION INTRAMUSCULAR at 19:59

## 2017-12-14 RX ADMIN — DOCUSATE SODIUM 100 MG: 100 CAPSULE, LIQUID FILLED ORAL at 17:33

## 2017-12-14 NOTE — LACTATION NOTE
This note was copied from a baby's chart  [facility] MOB and FOB to call for assistance, questions and concerns  Extension number for inpatient lactation support provided

## 2017-12-14 NOTE — PROGRESS NOTES
Postpartum Progress Note       PROCEDURE: Primary Low  Delivery    SUBJECTIVE:    Pain: yes    Tolerating Oral Intake: yes     Voiding: yes    Flatus: no    Bowel Movement: yes    Ambulating: yes     Breastfeeding: yes    Chest Pain: no    Shortness of Breath: no    Leg Pain/Discomfort: no    Lochia: heavy    Overnight event: pt c/o sleepiness,  She has been sleepy more than usual, and fatigue, passed "a lot of clots" throughout the day, but unable to estimate the amount because she threw diapers/pads away and flushed it straight away  Vs: 140/75, ; Hb was repeated trending down from 8 4 () to 5 9  Was transfused 2 units  Currently, states "feel better"      OBJECTIVE:    General: No Acute Distress     Cardiovascular: Regular, Rate and Rhythm, no murmur, gallop or rub     Lungs: Clear to Auscultation Bilaterally, no wheezing, rhonchi or rales     Abdomen: Soft, non-distended, non-tender, no rebound, guarding or CVA tenderness    Need incision check     Fundus: Firm & Non-Tender     Fundal Location: at the umbilicus    Lower Extremities: Non-Tender    Vitals:    17 0611   BP: 117/71   Pulse: (!) 109   Resp: 20   Temp: 98 2 °F (36 8 °C)   SpO2:          Results from last 7 days  Lab Units 17  0519 17  0436 17  0530 17  0004   WBC Thousand/uL 7 30 7 45 11 22* 6 76   HEMOGLOBIN g/dL 5 9* 5 9* 8 4* 9 4*   HEMATOCRIT % 18 8* 18 3* 27 3* 28 7*   PLATELETS Thousands/uL 167 171 242 265   NEUTROS PCT %  --  67 81* 69   MONOS PCT %  --  8 7 8       Results from last 7 days  Lab Units 17  1714   SODIUM mmol/L 133*   POTASSIUM mmol/L 4 4   CHLORIDE mmol/L 103   CO2 mmol/L 21   BUN mg/dL 7   CREATININE mg/dL 0 55*   CALCIUM mg/dL 8 9   TOTAL PROTEIN g/dL 8 0   BILIRUBIN TOTAL mg/dL 0 39   ALK PHOS U/L 216*   ALT U/L 13   AST U/L 19   GLUCOSE RANDOM mg/dL 52*         No results found for: PHOS     Results from last 7 days  Lab Units 17  0004   INR  1 03   PTT seconds 22*         No results found for: TROPONINI  ABG:No results found for: PHART, WQK6MXO, PO2ART, OUE3KXM, G1LAGFKC, BEART, SOURCE  LABS / TESTS / MEDICATION:      Admission on 12/12/2017   Component Date Value    ABO Grouping 12/12/2017 O     Rh Factor 12/12/2017 Positive     Antibody Screen 12/12/2017 Negative     Specimen Expiration Date 12/12/2017 [id number]     WBC 12/12/2017 5 96     RBC 12/12/2017 4 06     Hemoglobin 12/12/2017 10 9*    Hematocrit 12/12/2017 33 7*    MCV 12/12/2017 83     MCH 12/12/2017 26 8     MCHC 12/12/2017 32 3     RDW 12/12/2017 15 6*    MPV 12/12/2017 10 9     Platelets 49/75/7644 292     nRBC 12/12/2017 0     Neutrophils Relative 12/12/2017 61     Lymphocytes Relative 12/12/2017 27     Monocytes Relative 12/12/2017 10     Eosinophils Relative 12/12/2017 2     Basophils Relative 12/12/2017 0     Neutrophils Absolute 12/12/2017 3 63     Lymphocytes Absolute 12/12/2017 1 62     Monocytes Absolute 12/12/2017 0 58     Eosinophils Absolute 12/12/2017 0 10     Basophils Absolute 12/12/2017 0 01     RPR 12/13/2017 Non-Reactive     Sodium 12/12/2017 133*    Potassium 12/12/2017 4 4     Chloride 12/12/2017 103     CO2 12/12/2017 21     Anion Gap 12/12/2017 9     BUN 12/12/2017 7     Creatinine 12/12/2017 0 55*    Glucose 12/12/2017 52*    Calcium 12/12/2017 8 9     AST 12/12/2017 19     ALT 12/12/2017 13     Alkaline Phosphatase 12/12/2017 216*    Total Protein 12/12/2017 8 0     Albumin 12/12/2017 2 6*    Total Bilirubin 12/12/2017 0 39     eGFR 12/12/2017 119     Clarity, UA 12/12/2017 Clear     Color, UA 12/12/2017 Yellow     Specific Gravity, UA 12/12/2017 1 010     pH, UA 12/12/2017 6 5     Glucose, UA 12/12/2017 Negative     Ketones, UA 12/12/2017 Negative     Blood, UA 12/12/2017 Negative     Protein, UA 12/12/2017 Negative     Nitrite, UA 12/12/2017 Negative     Bilirubin, UA 12/12/2017 Negative     Urobilinogen, UA 12/12/2017 0 2  Leukocytes, UA 12/12/2017 Negative     WBC, UA 12/12/2017 None Seen     RBC, UA 12/12/2017 None Seen     Hyaline Casts, UA 12/12/2017 None Seen     Bacteria, UA 12/12/2017 None Seen     Epithelial Cells 12/12/2017 None Seen     Creatinine, Ur 12/12/2017 29 6     Protein Urine Random 12/12/2017 19     Prot/Creat Ratio, Ur 12/12/2017 0 64*    pH, Cord Patel 12/13/2017 7 280     pCO2, Cord Patel 12/13/2017 51 0*    pO2, Cord Patel 12/13/2017 19 1     HCO3, Cord Patel 12/13/2017 23 4     Base Exc, Cord Patel 12/13/2017 -3 9*    O2 Cont, Cord Patel 12/13/2017 8 3     O2 HGB,VENOUS CORD 12/13/2017 36 5     pH, Cord Art 12/13/2017 7  247     pCO2, Cord Art 12/13/2017 54 7     pO2, Cord Art 12/13/2017 15 1     HCO3, Cord Art 12/13/2017 23 3     Base Exc, Cord Art 12/13/2017 -4 8*    O2 Content, Cord Art 12/13/2017 5 7     O2 Hgb, Arterial Cord 12/13/2017 24 9     WBC 12/13/2017 6 76     RBC 12/13/2017 3 50*    Hemoglobin 12/13/2017 9 4*    Hematocrit 12/13/2017 28 7*    MCV 12/13/2017 82     MCH 12/13/2017 26 9     MCHC 12/13/2017 32 8     RDW 12/13/2017 15 9*    MPV 12/13/2017 10 6     Platelets 36/17/6854 265     nRBC 12/13/2017 0     Neutrophils Relative 12/13/2017 69     Lymphocytes Relative 12/13/2017 22     Monocytes Relative 12/13/2017 8     Eosinophils Relative 12/13/2017 1     Basophils Relative 12/13/2017 0     Neutrophils Absolute 12/13/2017 4 67     Lymphocytes Absolute 12/13/2017 1 48     Monocytes Absolute 12/13/2017 0 53     Eosinophils Absolute 12/13/2017 0 06     Basophils Absolute 12/13/2017 0 00     Protime 12/13/2017 13 5     INR 12/13/2017 1 03     PTT 12/13/2017 22*    Unit Product Code 12/14/2017 O8663C33     Unit Number 12/14/2017 [id number]     Unit ABO 12/14/2017 O     Unit RH 12/14/2017 POS     Crossmatch 12/14/2017 Compatible     Unit Dispense Status 12/14/2017 Issued     Unit Product Code 12/14/2017 R1442G85     Unit Number 12/14/2017 [id number]     Unit ABO 12/14/2017 O     Unit RH 12/14/2017 POS     Crossmatch 12/14/2017 Compatible     Unit Dispense Status 12/14/2017 Crossmatched     WBC 12/13/2017 11 22*    RBC 12/13/2017 3 27*    Hemoglobin 12/13/2017 8 4*    Hematocrit 12/13/2017 27 3*    MCV 12/13/2017 84     MCH 12/13/2017 25 7*    MCHC 12/13/2017 30 8*    RDW 12/13/2017 15 8*    MPV 12/13/2017 10 5     Platelets 09/09/8698 242     nRBC 12/13/2017 0     Neutrophils Relative 12/13/2017 81*    Lymphocytes Relative 12/13/2017 12*    Monocytes Relative 12/13/2017 7     Eosinophils Relative 12/13/2017 0     Basophils Relative 12/13/2017 0     Neutrophils Absolute 12/13/2017 8 98*    Lymphocytes Absolute 12/13/2017 1 32     Monocytes Absolute 12/13/2017 0 82     Eosinophils Absolute 12/13/2017 0 05     Basophils Absolute 12/13/2017 0 01     Hepatitis B Surface Ag 12/13/2017 Non-reactive     Hepatitis C Ab 12/13/2017 Non-reactive     Rapid HIV 1 AND 2 12/13/2017 Non-Reactive     HIV-1 P24 Ag Screen 12/13/2017 Non-Reactive     WBC 12/14/2017 7 45     RBC 12/14/2017 2 23*    Hemoglobin 12/14/2017 5 9*    Hematocrit 12/14/2017 18 3*    MCV 12/14/2017 82     MCH 12/14/2017 26 5*    MCHC 12/14/2017 32 2     RDW 12/14/2017 16 0*    MPV 12/14/2017 9 9     Platelets 67/54/7927 171     nRBC 12/14/2017 0     Neutrophils Relative 12/14/2017 67     Lymphocytes Relative 12/14/2017 23     Monocytes Relative 12/14/2017 8     Eosinophils Relative 12/14/2017 2     Basophils Relative 12/14/2017 0     Neutrophils Absolute 12/14/2017 4 99     Lymphocytes Absolute 12/14/2017 1 73     Monocytes Absolute 12/14/2017 0 57     Eosinophils Absolute 12/14/2017 0 13     Basophils Absolute 12/14/2017 0 01     WBC 12/14/2017 7 30     RBC 12/14/2017 2 26*    Hemoglobin 12/14/2017 5 9*    Hematocrit 12/14/2017 18 8*    MCV 12/14/2017 83     MCH 12/14/2017 26 1*    MCHC 12/14/2017 31 4     RDW 12/14/2017 16 0*    MPV 2017 9 4     Platelets  167     nRBC 2017 0        Current Facility-Administered Medications   Medication Dose Route Frequency    acetaminophen (TYLENOL) tablet 650 mg  650 mg Oral Q6H PRN    benzocaine-menthol-lanolin-aloe (DERMOPLAST) 20-0 5 % topical spray 1 application  1 application Topical O2T PRN    docusate sodium (COLACE) capsule 100 mg  100 mg Oral BID    fentaNYL (SUBLIMAZE) injection 25 mcg  25 mcg Intravenous Q3 min PRN    ferrous sulfate tablet 325 mg  325 mg Oral Daily With Breakfast    hydrocortisone 1 % cream 1 application  1 application Topical Daily PRN    HYDROmorphone (DILAUDID) 1 mg/mL injection 0 5 mg  0 5 mg Intravenous Q5 Min PRN    HYDROmorphone (DILAUDID) 1 mg/mL injection 1 mg  1 mg Intravenous Q2H PRN    ibuprofen (MOTRIN) tablet 600 mg  600 mg Oral Q6H PRN    lactated ringers infusion  125 mL/hr Intravenous Continuous    ondansetron (ZOFRAN) injection 4 mg  4 mg Intravenous Once PRN    oxyCODONE-acetaminophen (PERCOCET) 5-325 mg per tablet 1 tablet  1 tablet Oral Q4H PRN    oxyCODONE-acetaminophen (PERCOCET) 5-325 mg per tablet 2 tablet  2 tablet Oral Q4H PRN    oxytocin (PITOCIN) 30 Units in lactated ringers 500 mL infusion  1-30 carlos-units/min Intravenous Continuous    simethicone (MYLICON) chewable tablet 80 mg  80 mg Oral Q6H PRN    witch hazel-glycerin (TUCKS) topical pad 1 pad  1 pad Topical Q4H PRN       ASSESSMENT AND PLAN:   Postop day 2 Status post: Primary Low  Delivery  1  Continue Routine Postpartum Care  2  Encourage Ambulation  3  Advance diet as tolerated  4  Plan Contraception discussed:  unsure plan of contraception now, will follow up at the point of discharge  5  Pruritis port-op: resolved  6   Anemia:   - transfused 2 units, will follow up closely on Vs, repeat CBC after finishing transfusion    Signature/Title: Jaya Tristan MD  Date: 2017  Time: 6:25 AM

## 2017-12-14 NOTE — SIGNIFICANT EVENT
67-year-old female seen at bedside this morning  The patient states that she has been sleepier than normal but denies any dizziness or lightheadedness  Her CBC came back this morning with a hemoglobin of 5 9 at that moment time we repeated results were consistent with prior results  Patient is in agreement of 2 units blood transfusion  Risks and benefits were discussed  She was consented yesterday prior to her  by myself and Dr Jase Dominguez  Repeat CBC after transfusion      Vitals:    17 0015   BP: 140/75   Pulse: (!) 111   Resp: 20   Temp: 97 7 °F (36 5 °C)   SpO2:

## 2017-12-14 NOTE — PROGRESS NOTES
Progress Note - OB/GYN   Natasha cherry Zen Pardo 44 y o  female MRN: [id number]  Unit/Bed#: -01 Encounter: [id number]    Assessment:  44 y o  [id number] s/p Primary low transverse  section post-operative day 2  Preeclampsia w/o severe fetaures: BP 110s-140s/70s, Asx  Anemia: Hg 10 9; 9 4; 8 4; 5 4, symptomatic with dizziness and tachycardia with 100-110s  For 2 U PRBC this Am  Currently stable- will continue to monitor  Plan:  Continue routine post partum care  Pain management PRN  2 U PRBC to be transfused  Will follow post transfusion CBC  Recommend monitoring urine output  Encourage ambulation  Encourage breastfeeding  FEN: Advance diet as tolerated  DVT Ppx: SCDs    Subjective/Objective   Chief Complaint:    Postpartum state    Subjective:   Patient states she had dizziness yesterday which has since improved  She denies any headache, light headedness, chest pain, shortness of breath, palpitations, N/V,or  visual disturbance     Pain: yes, cramping, improved with meds  Tolerating PO: yes  Voiding: yes  Flatus: no  BM: yes  Ambulating: minimally secondary to dizziness yesterday  Breastfeeding:  yes  Chest pain: no  Shortness of breath: no  Leg pain: no  Lochia: minimal    Objective:     Vitals: Temp:  [97 7 °F (36 5 °C)-98 4 °F (36 9 °C)] 98 2 °F (36 8 °C)  HR:  [] 109  Resp:  [18-20] 20  BP: (110-140)/(71-82) 117/71       Intake/Output Summary (Last 24 hours) at 17 0630  Last data filed at 17 1601   Gross per 24 hour   Intake              300 ml   Output              500 ml   Net             -200 ml     Physical Exam:   General: NAD, alert, oriented  Cardio: Regular rate and rhythm, no murmur  Resp: nonlabored breathing, clear to auscultation bilaterally  Abdomen: Soft, mild distention, appropriate tenderness, BS present x4 quadrants  Fundus: Firm, non-tender, fundus: at umbilicus  Incision: Sutures with overlying histoacryl clean/ dry/intact  G/U: minimal lochia noted on pad  Lower Extremities: Non-tender, no palpable cords    Medications:  Current Facility-Administered Medications   Medication Dose Route Frequency    acetaminophen (TYLENOL) tablet 650 mg  650 mg Oral Q6H PRN    benzocaine-menthol-lanolin-aloe (DERMOPLAST) 20-0 5 % topical spray 1 application  1 application Topical Y2L PRN    docusate sodium (COLACE) capsule 100 mg  100 mg Oral BID    fentaNYL (SUBLIMAZE) injection 25 mcg  25 mcg Intravenous Q3 min PRN    ferrous sulfate tablet 325 mg  325 mg Oral Daily With Breakfast    hydrocortisone 1 % cream 1 application  1 application Topical Daily PRN    HYDROmorphone (DILAUDID) 1 mg/mL injection 0 5 mg  0 5 mg Intravenous Q5 Min PRN    HYDROmorphone (DILAUDID) 1 mg/mL injection 1 mg  1 mg Intravenous Q2H PRN    ibuprofen (MOTRIN) tablet 600 mg  600 mg Oral Q6H PRN    lactated ringers infusion  125 mL/hr Intravenous Continuous    ondansetron (ZOFRAN) injection 4 mg  4 mg Intravenous Once PRN    oxyCODONE-acetaminophen (PERCOCET) 5-325 mg per tablet 1 tablet  1 tablet Oral Q4H PRN    oxyCODONE-acetaminophen (PERCOCET) 5-325 mg per tablet 2 tablet  2 tablet Oral Q4H PRN    oxytocin (PITOCIN) 30 Units in lactated ringers 500 mL infusion  1-30 carlos-units/min Intravenous Continuous    simethicone (MYLICON) chewable tablet 80 mg  80 mg Oral Q6H PRN    witch hazel-glycerin (TUCKS) topical pad 1 pad  1 pad Topical Q4H PRN       Labs:   Recent Results (from the past 24 hour(s))   CBC and differential    Collection Time: 12/14/17  4:36 AM   Result Value Ref Range    WBC 7 45 4 31 - 10 16 Thousand/uL    RBC 2 23 (L) 3 81 - 5 12 Million/uL    Hemoglobin 5 9 (LL) 11 5 - 15 4 g/dL    Hematocrit 18 3 (L) 34 8 - 46 1 %    MCV 82 82 - 98 fL    MCH 26 5 (L) 26 8 - 34 3 pg    MCHC 32 2 31 4 - 37 4 g/dL    RDW 16 0 (H) 11 6 - 15 1 %    MPV 9 9 8 9 - 12 7 fL    Platelets 499 196 - 558 Thousands/uL    nRBC 0 /100 WBCs    Neutrophils Relative 67 43 - 75 %    Lymphocytes Relative 23 14 - 44 % Monocytes Relative 8 4 - 12 %    Eosinophils Relative 2 0 - 6 %    Basophils Relative 0 0 - 1 %    Neutrophils Absolute 4 99 1 85 - 7 62 Thousands/µL    Lymphocytes Absolute 1 73 0 60 - 4 47 Thousands/µL    Monocytes Absolute 0 57 0 17 - 1 22 Thousand/µL    Eosinophils Absolute 0 13 0 00 - 0 61 Thousand/µL    Basophils Absolute 0 01 0 00 - 0 10 Thousands/µL   CBC and differential    Collection Time: 12/14/17  5:19 AM   Result Value Ref Range    WBC 7 30 4 31 - 10 16 Thousand/uL    RBC 2 26 (L) 3 81 - 5 12 Million/uL    Hemoglobin 5 9 (LL) 11 5 - 15 4 g/dL    Hematocrit 18 8 (L) 34 8 - 46 1 %    MCV 83 82 - 98 fL    MCH 26 1 (L) 26 8 - 34 3 pg    MCHC 31 4 31 4 - 37 4 g/dL    RDW 16 0 (H) 11 6 - 15 1 %    MPV 9 4 8 9 - 12 7 fL    Platelets 864 605 - 867 Thousands/uL    nRBC 0 /100 WBCs   Prepare RBC:Has consent been obtained?  Yes, 2 Units    Collection Time: 12/14/17  6:01 AM   Result Value Ref Range    Unit Product Code B9944U94     Unit Number [id number]     Unit ABO O     Unit [facility] POS     Crossmatch Compatible     Unit Dispense Status Issued     Unit Product Code K5198G84     Unit Number [id number]     Unit ABO O     Unit [facility] POS     Crossmatch Compatible     Unit Dispense Status Crossmatched      Royal Josephyamile  12/14/2017  6:30 AM

## 2017-12-15 VITALS
RESPIRATION RATE: 18 BRPM | BODY MASS INDEX: 27.66 KG/M2 | DIASTOLIC BLOOD PRESSURE: 98 MMHG | TEMPERATURE: 97.8 F | HEIGHT: 64 IN | WEIGHT: 162 LBS | OXYGEN SATURATION: 98 % | HEART RATE: 93 BPM | SYSTOLIC BLOOD PRESSURE: 137 MMHG

## 2017-12-15 PROBLEM — Z3A.40 40 WEEKS GESTATION OF PREGNANCY: Status: RESOLVED | Noted: 2017-12-12 | Resolved: 2017-12-15

## 2017-12-15 PROBLEM — B95.1 POSITIVE GBS TEST: Status: RESOLVED | Noted: 2017-12-12 | Resolved: 2017-12-15

## 2017-12-15 LAB
ABO GROUP BLD BPU: NORMAL
ABO GROUP BLD BPU: NORMAL
BPU ID: NORMAL
BPU ID: NORMAL
CROSSMATCH: NORMAL
CROSSMATCH: NORMAL
ERYTHROCYTE [DISTWIDTH] IN BLOOD BY AUTOMATED COUNT: 15.3 % (ref 11.6–15.1)
HCT VFR BLD AUTO: 26.7 % (ref 34.8–46.1)
HGB BLD-MCNC: 8.8 G/DL (ref 11.5–15.4)
MCH RBC QN AUTO: 27.6 PG (ref 26.8–34.3)
MCHC RBC AUTO-ENTMCNC: 33 G/DL (ref 31.4–37.4)
MCV RBC AUTO: 84 FL (ref 82–98)
PLATELET # BLD AUTO: 214 THOUSANDS/UL (ref 149–390)
PMV BLD AUTO: 10.1 FL (ref 8.9–12.7)
RBC # BLD AUTO: 3.19 MILLION/UL (ref 3.81–5.12)
UNIT DISPENSE STATUS: NORMAL
UNIT DISPENSE STATUS: NORMAL
UNIT PRODUCT CODE: NORMAL
UNIT PRODUCT CODE: NORMAL
UNIT RH: NORMAL
UNIT RH: NORMAL
WBC # BLD AUTO: 7.69 THOUSAND/UL (ref 4.31–10.16)

## 2017-12-15 PROCEDURE — 85027 COMPLETE CBC AUTOMATED: CPT | Performed by: FAMILY MEDICINE

## 2017-12-15 RX ORDER — ACETAMINOPHEN 325 MG/1
650 TABLET ORAL EVERY 4 HOURS PRN
Qty: 30 TABLET | Refills: 0 | Status: SHIPPED | OUTPATIENT
Start: 2017-12-15 | End: 2019-11-19 | Stop reason: ALTCHOICE

## 2017-12-15 RX ORDER — OXYCODONE HYDROCHLORIDE AND ACETAMINOPHEN 5; 325 MG/1; MG/1
1-2 TABLET ORAL EVERY 4 HOURS PRN
Qty: 10 TABLET | Refills: 0 | Status: SHIPPED | OUTPATIENT
Start: 2017-12-15 | End: 2017-12-25

## 2017-12-15 RX ORDER — MEDROXYPROGESTERONE ACETATE 150 MG/ML
150 INJECTION, SUSPENSION INTRAMUSCULAR ONCE
Status: COMPLETED | OUTPATIENT
Start: 2017-12-15 | End: 2017-12-15

## 2017-12-15 RX ORDER — IBUPROFEN 600 MG/1
600 TABLET ORAL EVERY 6 HOURS PRN
Qty: 30 TABLET | Refills: 0 | Status: SHIPPED | OUTPATIENT
Start: 2017-12-15 | End: 2019-11-19 | Stop reason: ALTCHOICE

## 2017-12-15 RX ADMIN — OXYCODONE HYDROCHLORIDE AND ACETAMINOPHEN 1 TABLET: 5; 325 TABLET ORAL at 10:21

## 2017-12-15 RX ADMIN — DOCUSATE SODIUM 100 MG: 100 CAPSULE, LIQUID FILLED ORAL at 08:37

## 2017-12-15 RX ADMIN — OXYCODONE HYDROCHLORIDE AND ACETAMINOPHEN 2 TABLET: 5; 325 TABLET ORAL at 04:48

## 2017-12-15 RX ADMIN — MEDROXYPROGESTERONE ACETATE 150 MG: 150 INJECTION, SUSPENSION INTRAMUSCULAR at 10:22

## 2017-12-15 RX ADMIN — Medication 325 MG: at 07:46

## 2017-12-15 NOTE — PROGRESS NOTES
Postpartum Progress Note       PROCEDURE: Primary Low  Delivery    SUBJECTIVE:    Pain: yes    Tolerating Oral Intake: yes     Voiding: yes    Flatus: yes    Bowel Movement: yes    Ambulating: yes    Breastfeeding: yes    Chest Pain: no    Shortness of Breath: no    Leg Pain/Discomfort: no    Lochia: minimal      OBJECTIVE:    General: No Acute Distress     Cardiovascular: Regular, Rate and Rhythm, no murmur, gallop or rub     Lungs: Clear to Auscultation Bilaterally, no wheezing, rhonchi or rales     Abdomen: Soft, non-distended, non-tender, no rebound, guarding or CVA tenderness  Need incision check     Fundus: Firm & Non-Tender     Fundal Location:   -2 at the umbilicus        Lower Extremities: Non-Tender, Homans' sign negative bilateral    Vitals:    17 2347   BP: 122/80   Pulse: 95   Resp: 18   Temp: 98 °F (36 7 °C)   SpO2:          Results from last 7 days  Lab Units 12/15/17  0436 17  1623 17  0519 17  0436 17  0530 17  0004   WBC Thousand/uL 7 69 10 72* 7 30 7 45 11 22* 6 76   HEMOGLOBIN g/dL 8 8* 9 4* 5 9* 5 9* 8 4* 9 4*   HEMATOCRIT % 26 7* 28 6* 18 8* 18 3* 27 3* 28 7*   PLATELETS Thousands/uL 214 229 167 171 242 265   NEUTROS PCT %  --   --   --  67 81* 69   MONOS PCT %  --   --   --  8 7 8   MONO PCT MAN %  --   --  2*  --   --   --        Results from last 7 days  Lab Units 17  1714   SODIUM mmol/L 133*   POTASSIUM mmol/L 4 4   CHLORIDE mmol/L 103   CO2 mmol/L 21   BUN mg/dL 7   CREATININE mg/dL 0 55*   CALCIUM mg/dL 8 9   TOTAL PROTEIN g/dL 8 0   BILIRUBIN TOTAL mg/dL 0 39   ALK PHOS U/L 216*   ALT U/L 13   AST U/L 19   GLUCOSE RANDOM mg/dL 52*         No results found for: PHOS     Results from last 7 days  Lab Units 17  0004   INR  1 03   PTT seconds 22*         No results found for: TROPONINI  ABG:No results found for: PHART, FKM7ACZ, PO2ART, LDI2JIB, J1YZPYDW, BEART, SOURCE  LABS / TESTS / MEDICATION:      Admission on 2017   No results displayed because visit has over 200 results  Current Facility-Administered Medications   Medication Dose Route Frequency    acetaminophen (TYLENOL) tablet 650 mg  650 mg Oral Q6H PRN    benzocaine-menthol-lanolin-aloe (DERMOPLAST) 20-0 5 % topical spray 1 application  1 application Topical O3O PRN    docusate sodium (COLACE) capsule 100 mg  100 mg Oral BID    fentaNYL (SUBLIMAZE) injection 25 mcg  25 mcg Intravenous Q3 min PRN    ferrous sulfate tablet 325 mg  325 mg Oral Daily With Breakfast    hydrocortisone 1 % cream 1 application  1 application Topical Daily PRN    HYDROmorphone (DILAUDID) 1 mg/mL injection 0 5 mg  0 5 mg Intravenous Q5 Min PRN    HYDROmorphone (DILAUDID) 1 mg/mL injection 1 mg  1 mg Intravenous Q2H PRN    ibuprofen (MOTRIN) tablet 600 mg  600 mg Oral Q6H PRN    lactated ringers infusion  125 mL/hr Intravenous Continuous    ondansetron (ZOFRAN) injection 4 mg  4 mg Intravenous Once PRN    oxyCODONE-acetaminophen (PERCOCET) 5-325 mg per tablet 1 tablet  1 tablet Oral Q4H PRN    oxyCODONE-acetaminophen (PERCOCET) 5-325 mg per tablet 2 tablet  2 tablet Oral Q4H PRN    oxytocin (PITOCIN) 30 Units in lactated ringers 500 mL infusion  1-30 carlos-units/min Intravenous Continuous    simethicone (MYLICON) chewable tablet 80 mg  80 mg Oral Q6H PRN    witch hazel-glycerin (TUCKS) topical pad 1 pad  1 pad Topical Q4H PRN       ASSESSMENT AND PLAN:   Postop day 3   Status post: Primary Low  Delivery  1  Continue Routine Postpartum Care  2  Encourage Ambulation  3  Advance diet as tolerated  4  Plan Contraception discussed: [employer] [facility] will be given today  5   Acute anemia 2/2 increased postpartum lochia: improving  - Hb: 5 9 (2 units) --> 9 4 --> 8 8   - asymptomatic  - Ferrous sulfate 325 mg PO daily started on     Possible d/c today    Signature/Title: Jaya Hennessy MD  Date: 12/15/2017  Time: 6:37 AM

## 2017-12-15 NOTE — PLAN OF CARE
HEMATOLOGIC - ADULT     Maintains hematologic stability Adequate for Discharge        INFECTION - ADULT     Absence or prevention of progression during hospitalization Adequate for Discharge     Absence of fever/infection during neutropenic period Adequate for Discharge        POSTPARTUM     Experiences normal postpartum course Adequate for Discharge     Appropriate maternal -  bonding Adequate for Discharge     Establishment of infant feeding pattern Adequate for Discharge     Incision(s), wounds(s) or drain site(s) healing without S/S of infection Adequate for Discharge        SAFETY ADULT     Patient will remain free of falls Adequate for Discharge     Maintain or return to baseline ADL function Adequate for Discharge     Maintain or return mobility status to optimal level Adequate for Discharge

## 2017-12-15 NOTE — LACTATION NOTE
This note was copied from a baby's chart  Met with mother to go over feeding log since birth for the first week  Emphasized 8 or more (12) feedings in a 24 hour period, what to expect for the number of diapers per day of life and the progression of properties of the  stooling pattern  Discussed s/s that breastfeeding is going well after day 4 and when to get help from a pediatrician or lactation support person after day 4  Booklet included Breast Pumping Instructions, When You Go Back to Work or School, and Breastfeeding Resources for after discharge including access to the number for the [employer]  Handouts for paced feeding, breastfeeding log, pumping instructions all given in [place]  [employer] M[facility] and FOB to call for assistance, questions and concerns  Extension number for inpatient lactation support provided

## 2017-12-15 NOTE — PLAN OF CARE
HEMATOLOGIC - ADULT     Maintains hematologic stability Progressing        INFECTION - ADULT     Absence or prevention of progression during hospitalization Progressing     Absence of fever/infection during neutropenic period Progressing        POSTPARTUM     Experiences normal postpartum course Progressing     Appropriate maternal -  bonding Progressing     Establishment of infant feeding pattern Progressing     Incision(s), wounds(s) or drain site(s) healing without S/S of infection Progressing        SAFETY ADULT     Patient will remain free of falls Progressing     Maintain or return to baseline ADL function Progressing     Maintain or return mobility status to optimal level Progressing

## 2018-01-10 NOTE — PROGRESS NOTES
NOV 10 2017         RE: Delaney Garcia                           [facility]   MR#: [id number]                                  [address] [place] [address]   : [facility], [address]   ENC:                                              Fax: [phone]   (Exam #: [id number])      The LMP of this 44year old,  G6, P3-0-2-3 patient was MAR 5 2017, giving   her an CATHY of DEC 10 2017 and a current gestational age of 27 weeks 5 days   by dates  A sonographic examination was performed on NOV 10 2017 using   real time equipment  The ultrasound examination was performed using   abdominal & vaginal techniques  Earliest US on record:  17  25w4d  17 CATHY      An ultrasound was requested in triage because Yeyo reported vaginal   spotting and had not had a recent episode of intercourse with the last 2   weeks  Her placenta was low lying at 25 weeks and an anterior marginal   previa at 32 weeks and resolved by 34 weeks at an outside institution  She   has since transferred care to the [facility] clinic in [place]  Her prior   ultrasound reports are in [employer] and her fetal anatomy was completed   with her 17 scan        Cardiac motion was observed at 160 bpm       INDICATIONS      placental location   vaginal bleeding   advanced maternal age      Exam Types      Level I   Transvaginal      RESULTS      Fetus # 1 of 1   Vertex presentation   Fetal growth appeared normal   Placenta Location = Right lateral   No placenta previa   Placenta Grade = II      AMNIOTIC FLUID      Q1: 2 3      Q2: 4 5      Q3: 2 5      Q4: 1 6   TANI Total = 10 9 cm   Amniotic Fluid: Normal      MEASUREMENTS (* Included In Average GA)      AC              32 0 cm        36 weeks 1 day * (58%)   BPD              8 1 cm        32 weeks 2 days* (<5%)   HC              31 3 cm        34 weeks 4 days* (19%)   Femur            6 9 cm        34 weeks 6 days* (45%) Cerebellum       4 7 cm        35 weeks 6 days      HC/AC           0 98   FL/AC           0 22   FL/BPD          0 85   EFW (Ac/Fl/Hc)  2699 grams - 5 lbs 15 oz                 (42%)      THE AVERAGE GESTATIONAL AGE is 34 weeks 4 days +/- 21 days  FETAL VESSELS                                     S/D   PI    RI    PSV   AEDV RF                                                    cm/s       Middle Cerebral Artery     9 86  2 18  0 90  56 98 No      ANATOMY COMMENTS      Fetal anatomy was completed on a prior ultrasound at an outside   institution  No fetal structural abnormality is identified on the Level I survey today  Intracranial anatomy (calvarium, cavum, lateral ventricles, and   cerebellum), cardiac anatomy (4chamber, LVOT, RVOT, and 3VV), diaphragm,   stomach, kidneys, and bladder appear normal       The middle cerebral artery dopplers are normal for gestational age  There   is no cephalization of flow or fetal anemia seen  There is no evidence for   fetal anemia  IMPRESSION      Perales IUP   34 weeks and 4 days by this ultrasound  (CATHY=DEC 18 2017)   Vertex presentation   Fetal growth appeared normal   Regular fetal heart rate of 160 bpm   Right lateral placenta   No placenta previa      GENERAL COMMENT      If there is no evidence of  labor and fetal testing is reassuring   then discharge home is recommended  Recommend twice weekly nonstress tests   and weekly fluid scans for the rest of the pregnancy for third trimester   bleeding  Patient's ultrasound was done by 3 PM  The full MFM consult was   not completed as patient needed to leave before I could see her at 5  Her   blood type is O+  NIPT was normal in this pregnancy  SHAWN Torres , WILLY Kaplan     Maternal-Fetal Medicine   Electronically signed 17 14:55

## 2018-01-13 NOTE — PROGRESS NOTES
2017         RE: Monse Avendano                           To: 99 Edwardo Girard   MR#: [id number]                                  [address] [place] [address]   : 600 [facility], [address]   ENC: [id number]                             Fax: [phone]   (Exam #: [id number])      The LMP of this 44year old,  G6, P3-0-2-3 patient was MAR 5 2017, giving   her an CATHY of DEC 10 2017 and a current gestational age of 42 weeks 2 days   by dates  A sonographic examination was performed on 2017 using   real time equipment  The ultrasound examination was performed using   abdominal & vaginal techniques  The patient has a BMI of 25 5  Her blood   pressure today was 119/77  Earliest US on record:  17  25w4d  17 CATHY      The patient presents today for a follow-up ultrasound in order to evaluate   the fetal anatomy and fetal growth as well as evaluate the placenta  Please see her prior ultrasound that was performed as an inpatient that   further discusses the patient's pregnancy course  She had an episode of   bleeding approximately 5 days ago  She has had no bleeding since  She   had a placenta previa that appears to have resolved although is not clear   based upon previous documentation  She has a history of 3 previous   full-term vaginal deliveries without complications  She had multiple   abortions in 2015 and 2016  Her medical course has been otherwise   uncomplicated  She denies the current use of tobacco, alcohol, or drugs  She currently takes prenatal vitamins and has no significant drug   allergies  Her family medical history unremarkable   Review of systems   is otherwise negative  On exam, the patient appears well, in no acute   distress, and her abdomen is nontender          Cardiac motion was observed at 151 bpm       INDICATIONS      placental location   advanced maternal age   late transfer      Exam Types      LEVEL II   Transvaginal      RESULTS      Fetus # 1 of 1   Vertex presentation   Fetal growth appeared normal   Placenta Location = Anterior, right lateral   No placenta previa   Placenta Grade = II      MEASUREMENTS (* Included In Average GA)      AC              34 4 cm        38 weeks 4 days* (74%)   BPD              8 3 cm        33 weeks 4 days* (<5%)   HC              30 7 cm        33 weeks 6 days* (<5%)   Femur            7 2 cm        36 weeks 1 day * (42%)      Humerus          6 2 cm        36 weeks 0 days  (49%)      Cerebellum       4 9 cm        36 weeks 3 days      HC/AC           0 89   FL/AC           0 21   FL/BPD          0 86   EFW (Ac/Fl/Hc)  3087 grams - 6 lbs 13 oz                 (47%)      THE AVERAGE GESTATIONAL AGE is 35 weeks 4 days +/- 21 days  AMNIOTIC FLUID      Q1: 1 4      Q2: 4 6      Q3: 2 5      Q4: 2 8   TANI Total = 11 4 cm   Amniotic Fluid: Normal      CERVICAL EVALUATION      SUPINE      Cervical Length: 5 00 cm      OTHER TEST RESULTS           Funneling?: No             Dynamic Changes?: No      ANATOMY      Head                                    See Details   Face/Neck                               Not Visualized   Th  Cav  Normal   Heart                                   See Details   Abd  Cav  Normal   Stomach                                 Normal   Right Kidney                            Normal   Left Kidney                             Normal   Bladder                                 Normal   Abd  Wall                               Not Visualized   Spine                                   Normal   Extrems                                 See Details   Genitalia                               Not Visualized   Placenta                                Normal   Umbl   Cord                              Normal   Uterus                                  Normal   PCI                                     Not Visualized      ANATOMY DETAILS      Visualized Appearing Sonographically Normal:   HEAD: (Calvarium, BPD Level, Cavum, Lateral Ventricles, Cerebellum);      TH  CAV  : (Lungs, Diaphragm); HEART: (Four Chamber View, Proximal Left   Outflow, Proximal Right Outflow, Cardiac Axis, Cardiac Position);    ABD    CAV : (Liver);    STOMACH, RIGHT KIDNEY, LEFT KIDNEY, BLADDER, SPINE:   (Cervical Spine, Thoracic Spine, Lumbar Spine, Sacrum);    EXTREMS: (Rt   Humerus, Rt Forearm, Rt Femur); PLACENTA, UMBL  CORD, UTERUS      Not Visualized:   HEAD: (Choroid Plexus, Cisterna Magna);    FACE/NECK: (Neck, Nuchal Fold,   Profile, Orbits, Nose/Lips, Palate, Face); HEART: (3VV, 3 Vessel   Trachea, Short Axis of Greater Vessels, Ductal Arch, Aortic Arch,   Interventricular Septum, Interatrial Septum, IVC, SVC);    ABD  WALL,   EXTREMS: (Lt Humerus, Lt Forearm, Lt Hand, Rt Hand, Lt Femur, Lt Low Leg,   Rt Low Leg, Lt Foot, Rt Foot);    GENITALIA, PCI      IMPRESSION      Perales IUP   35 weeks and 4 days by this ultrasound  (CATHY=DEC 22 2017)   Vertex presentation   Fetal growth appeared normal   Regular fetal heart rate of 151 bpm   Anterior, right lateral placenta   No placenta previa      GENERAL COMMENT         Today's scan is limited by gestational age; therefore, the fetal anatomic   survey could not be completed  Although the head circumference measures   less than the 5th percentile, it is only 2 standard deviations below the   mean with otherwise reassuring intracranial anatomy  The patient has not   traveled outside of the [facility] over the past few years  Good fetal   movement and tone are seen  The amniotic fluid volume appears normal     The placenta is anterior right lateral and it appears sonographically   normal   A transvaginal ultrasound was performed to assess the cervix,   which was not seen well transabdominally    The cervical length was 5 0   centimeters, which is normal for the current gestational age  There was   no significant funneling or dynamic changes appreciated  The limitations   of ultrasound were reviewed with the patient, which she appears to   understand and accepts  She was informed of today's findings and all of   her questions were answered  Please note, in addition to the time spent discussing the results of the   ultrasound, I spent approximately 10  minutes of face-to-face time with   the patient, greater than 50% of which was spent in counseling and the   coordination of care for this patient  Thank you very much for allowing us to participate in the care of this   very nice patient  Should you have any questions, please do not hesitate   to contact our office  SHAWN Silva M D     Electronically signed 11/21/17 12:09

## 2018-01-22 VITALS
HEIGHT: 64 IN | SYSTOLIC BLOOD PRESSURE: 130 MMHG | BODY MASS INDEX: 26.29 KG/M2 | WEIGHT: 154 LBS | HEART RATE: 99 BPM | RESPIRATION RATE: 16 BRPM | DIASTOLIC BLOOD PRESSURE: 85 MMHG

## 2018-01-22 VITALS
SYSTOLIC BLOOD PRESSURE: 119 MMHG | BODY MASS INDEX: 25.39 KG/M2 | WEIGHT: 158 LBS | DIASTOLIC BLOOD PRESSURE: 77 MMHG | HEIGHT: 66 IN

## 2018-01-23 NOTE — MISCELLANEOUS
Message  Rec'd ph/call from pt, she says her due date was yesterday and for the past 2 days, she's been having contractions every 15-20min during the night and every 40min-1hr during the day  Pt says her water has not broken  Per chart review, pt was a tx from [place] at 35wks and had a PN intake appt with [facility] in early November  Was sent to L&D triage in [place] after her intake appt for 3days vaginal bleed  Pt then had an US at [facility]  on 11/21  Never came in for her PN H&P with [facility]  D/w [facility] and she viewed pt's US from 11/21  Pt scheduled for appt with the physician on 12/12 at [address] [place] [address]  Instructed pt to go to L&D triage in [place] if her contractions get closer together and more forceful before her appt  Active Problems    1  Encounter for supervision of normal pregnancy in third trimester (V22 1) (Z34 93)    Current Meds   1  Iron TABS; Therapy: (Recorded:21Nov2017) to Recorded   2  Vitamin C CAPS; Therapy: (Recorded:21Nov2017) to Recorded    Allergies    1  No Known Drug Allergies    2   Pollen    Signatures   Electronically signed by : Leeanne Soriano RN; Dec 11 2017  9:21AM EST                       (Author)

## 2018-01-24 VITALS
SYSTOLIC BLOOD PRESSURE: 133 MMHG | HEIGHT: 66 IN | BODY MASS INDEX: 26.03 KG/M2 | HEART RATE: 98 BPM | WEIGHT: 162 LBS | DIASTOLIC BLOOD PRESSURE: 93 MMHG

## 2018-03-07 NOTE — PROGRESS NOTES
Education  Baby & Me Education 3rd Trimester: Third Trimester Education provided:   benefits of breastfeeding, importance of exclusive breastfeeding, early initiation of breastfeeding, exclusive breastfeeding for the first 6 months, importance of early skin-to-skin contact and Other education given: Handouts in [facility] [place]  rooming-in on 24 hour basis   The patient is planning on breastfeeding        Individualized education given: Lactation consultant in hospital    Prenatal education provided by: Jayda Jensen RN      Signatures   Electronically signed by : Jayda Jensen RN; Nov 9 2017 12:37PM EST                       (Author)

## 2019-11-19 ENCOUNTER — ANNUAL EXAM (OUTPATIENT)
Dept: OBGYN CLINIC | Facility: CLINIC | Age: 39
End: 2019-11-19

## 2019-11-19 VITALS
HEART RATE: 92 BPM | BODY MASS INDEX: 26.4 KG/M2 | HEIGHT: 63 IN | WEIGHT: 149 LBS | SYSTOLIC BLOOD PRESSURE: 145 MMHG | DIASTOLIC BLOOD PRESSURE: 97 MMHG

## 2019-11-19 DIAGNOSIS — Z32.01 POSITIVE URINE PREGNANCY TEST: ICD-10-CM

## 2019-11-19 DIAGNOSIS — Z01.411 ENCOUNTER FOR GYNECOLOGICAL EXAMINATION WITH ABNORMAL FINDING: Primary | ICD-10-CM

## 2019-11-19 LAB — SL AMB POCT URINE HCG: POSITIVE

## 2019-11-19 PROCEDURE — 99386 PREV VISIT NEW AGE 40-64: CPT | Performed by: NURSE PRACTITIONER

## 2019-11-19 PROCEDURE — 81025 URINE PREGNANCY TEST: CPT | Performed by: NURSE PRACTITIONER

## 2019-11-19 NOTE — PROGRESS NOTES
ASSESSMENT & PLAN: Conchita Gomez is a 35 y o  [id number] with abnormal gynecologic exam Pt has a  positive pregnancy test  She is currently nursing her baby  She desires contraception  1   Routine well woman exam done today  2  Pap and HPV:  The patient's last pap and hpv was  2017   It was normal     Pap and cotesting was not done today  Current ASCCP Guidelines reviewed  Due in   3  Mammogram- patient currently breastfeeding her 3year-old child,  I recommend for mammogram after she has through nursing  4  The following were reviewed in today's visit: breast self exam, STD testing, HIV risk factors and prevention, family planning choices, adequate intake of calcium and vitamin D, exercise and healthy diet  5  Patient with positive UPT that is faint, will check beta HCG quant- will call results, return to office in 1 week   recent termination 3 weeks ago    CC:  Annual Gynecologic Examination    HPI: Conchita Gomez is a 35 y o  [id number] who presents for annual gynecologic examination    [id number]  used  LMP 2019,  Menses are regular  Patient is nursing her baby  Has never  mammogram   Patient  Last delivered by  2017  After exam was completed and pt and I had a conversation about birth control patient- reports she had a termination after it was found she had a faintly positive pregnancy test in the office  She  Reports she had unprotected intercourse about 1 week ago  Reviewed with patient to abstain for 1 week return to office at that time  Health Maintenance:    She wears her seatbelt routinely  She does perform regular monthly self breast exams  She feels safe at home       Patient Active Problem List   Diagnosis    S/P  section       Past Medical History:   Diagnosis Date    Acid reflux     with pregnancy    Heart palpitations     with pregnancy    Urinary tract infection     with pregnancy    Varicella        Past Surgical History:   Procedure Laterality Date    DILATION AND CURETTAGE OF UTERUS      x2    KS  DELIVERY ONLY N/A 2017    Procedure:  SECTION (); Surgeon: Aure Cherry DO;  Location: [facility] ;  Service: Obstetrics       Past OB/Gyn History:  OB History        6    Para   4    Term   4            AB   2    Living   4       SAB   2    TAB        Ectopic        Multiple   0    Live Births   4                  Pt has menstrual issues  Reports they are regular  History of sexually transmitted infection: No   History of abnormal pap smears: No      Patient is currently sexually active  heterosexual  The current method of family planning is none      Family History   Problem Relation Age of Onset    No Known Problems Mother     No Known Problems Father        Social History:  Social History     Socioeconomic History    Marital status: Single     Spouse name: Not on file    Number of children: Not on file    Years of education: Not on file    Highest education level: Not on file   Occupational History    Not on file   Social Needs    Financial resource strain: Not on file    Food insecurity:     Worry: Not on file     Inability: Not on file    Transportation needs:     Medical: Not on file     Non-medical: Not on file   Tobacco Use    Smoking status: Never Smoker    Smokeless tobacco: Never Used   Substance and Sexual Activity    Alcohol use: No    Drug use: No    Sexual activity: Yes     Partners: Male     Birth control/protection: None   Lifestyle    Physical activity:     Days per week: Not on file     Minutes per session: Not on file    Stress: Not on file   Relationships    Social connections:     Talks on phone: Not on file     Gets together: Not on file     Attends Baptism service: Not on file     Active member of club or organization: Not on file     Attends meetings of clubs or organizations: Not on file     Relationship status: Not on file    Intimate partner violence:     Fear of current or ex partner: Not on file     Emotionally abused: Not on file     Physically abused: Not on file     Forced sexual activity: Not on file   Other Topics Concern    Not on file   Social History Narrative    Not on file     Presently lives with family  Patient is single  No Known Allergies    Current Outpatient Medications:     acetaminophen (TYLENOL) 325 mg tablet, Take 2 tablets by mouth every 4 (four) hours as needed for mild pain, moderate pain, severe pain, headaches or fever, Disp: 30 tablet, Rfl: 0    ferrous sulfate 325 (65 Fe) mg tablet, Take 325 mg by mouth daily with breakfast, Disp: , Rfl:     ibuprofen (MOTRIN) 600 mg tablet, Take 1 tablet by mouth every 6 (six) hours as needed for mild pain, moderate pain, fever or headaches (cramping), Disp: 30 tablet, Rfl: 0    Prenatal MV-Min-Fe Fum-FA-DHA (PRENATAL 1 PO), Take by mouth daily, Disp: , Rfl:     Review of Systems:    Review of Systems  Constitutional :no fever, feels well, no tiredness, no recent weight gain or loss  ENT: no ear ache, no loss of hearing, no nosebleeds or nasal discharge, no sore throat or hoarseness  Cardiovascular: no complaints of slow or fast heart beat, no chest pain, no palpitations, no leg claudication or lower extremity edema  Respiratory: no complaints of shortness of shortness of breath, no FARRAR  Breasts:no complaints of breast pain, breast lump, or nipple discharge  Gastrointestinal: no complaints of abdominal pain, constipation, nausea, vomiting, or diarrhea or bloody stools  Genitourinary : no complaints of dysuria, incontinence, pelvic pain, no dysmenorrhea, vaginal discharge or abnormal vaginal bleeding and as noted in HPI  Musculoskeletal: no complaints of arthralgia, no myalgia, no joint swelling or stiffness, no limb pain or swelling    Integumentary: no complaints of skin rash or lesion, itching or dry skin  Neurological: no complaints of headache, no confusion, no numbness or tingling, no dizziness or fainting    Objective      There were no vitals taken for this visit  General:   appears stated age, cooperative, alert normal mood and affect   Neck: normal, supple,trachea midline, no masses   Heart: regular rate and rhythm, S1, S2 normal, no murmur, click, rub or gallop   Lungs: clear to auscultation bilaterally   Breasts: normal appearance, no masses or tenderness, Inspection negative, No nipple retraction or dimpling, No nipple discharge or bleeding, No axillary or supraclavicular adenopathy, Normal to palpation without dominant masses, Taught monthly breast self examination   Abdomen: soft, non-tender, without masses or organomegaly   Vulva: normal female genitalia, Bartholin's, Urethra, [place] normal   Vagina: normal vagina, no discharge, exudate, lesion, or erythema   Urethra: normal   Cervix: Normal, no discharge  Nontender  Uterus: normal size, contour, position, consistency, mobility, non-tender and anteverted   Adnexa: no mass, fullness, tenderness   Lymphatic palpation of lymph nodes in neck, axilla, groin and/or other locations: no lymphadenopathy or masses noted   Skin normal skin turgor and no rashes     Psychiatric orientation to person, place, and time: normal  mood and affect: normal

## 2019-12-02 NOTE — PROGRESS NOTES
Assessment/Plan:    No problem-specific Assessment & Plan notes found for this encounter  Diagnoses and all orders for this visit:    Negative pregnancy test  -     POCT urine HCG    Encounter for initial prescription of contraceptive pills  -     norethindrone (MICRONOR) 0 35 MG tablet; Take 1 tablet (0 35 mg total) by mouth daily for 28 days          Subjective:      Patient ID: Margy Foley is a 35 y o  female  HPI 36 yo  here for UPTresults, she had a weakly positive UPT on 19  Pt had a pregnancy termination recently-  About 4 weeks ago  LMP is today and UPT is negative  Reviewed BP is elevated today and was elevated at the last visit  Pt is interested in contraceptive patch, we reviewed contraceptive options with pt with increased BP  Reviewed Depo, Mirena - through [facility], POP's, and Nexplanon  Pt interested in OCP's today  Reviewed to start today and back up method for the first pack  Reviewed to take everyday at the same time  She is currently nursing her baby  she had her annual gyn exam 2019    The following portions of the patient's history were reviewed and updated as appropriate: allergies, current medications, past family history, past medical history, past social history, past surgical history and problem list     Review of Systems   Constitutional: Negative  Respiratory: Negative  Cardiovascular: Negative  Gastrointestinal: Negative  Neurological: Negative  Psychiatric/Behavioral: Negative  Objective:      LMP 2019 (Approximate)          Physical Exam   Constitutional: She appears well-nourished  Cardiovascular: Normal rate, regular rhythm and normal heart sounds  Pulmonary/Chest: Effort normal and breath sounds normal    Abdominal: Soft  Skin: Skin is warm and dry  Psychiatric: She has a normal mood and affect   Her behavior is normal

## 2019-12-03 ENCOUNTER — OFFICE VISIT (OUTPATIENT)
Dept: OBGYN CLINIC | Facility: CLINIC | Age: 39
End: 2019-12-03

## 2019-12-03 VITALS
DIASTOLIC BLOOD PRESSURE: 90 MMHG | HEART RATE: 100 BPM | RESPIRATION RATE: 16 BRPM | WEIGHT: 150.2 LBS | BODY MASS INDEX: 26.4 KG/M2 | SYSTOLIC BLOOD PRESSURE: 140 MMHG

## 2019-12-03 DIAGNOSIS — Z32.02 NEGATIVE PREGNANCY TEST: Primary | ICD-10-CM

## 2019-12-03 DIAGNOSIS — Z30.011 ENCOUNTER FOR INITIAL PRESCRIPTION OF CONTRACEPTIVE PILLS: ICD-10-CM

## 2019-12-03 PROBLEM — N91.2 AMENORRHEA: Status: ACTIVE | Noted: 2019-12-03

## 2019-12-03 LAB — SL AMB POCT URINE HCG: NEGATIVE

## 2019-12-03 PROCEDURE — 81025 URINE PREGNANCY TEST: CPT | Performed by: NURSE PRACTITIONER

## 2019-12-03 PROCEDURE — 99213 OFFICE O/P EST LOW 20 MIN: CPT | Performed by: NURSE PRACTITIONER

## 2019-12-03 RX ORDER — ACETAMINOPHEN AND CODEINE PHOSPHATE 120; 12 MG/5ML; MG/5ML
1 SOLUTION ORAL DAILY
Qty: 28 TABLET | Refills: 3 | Status: SHIPPED | OUTPATIENT
Start: 2019-12-03 | End: 2019-12-04 | Stop reason: ALTCHOICE

## 2019-12-03 NOTE — PATIENT INSTRUCTIONS
Reviewed POP use  RTO in 3 months for a pill check  Pt interested in Nexplanon- will check with Shital about cost  Reviewed Mirena IUD through [facility]  Reviewed to f/u with PCP for BP

## 2019-12-04 ENCOUNTER — PATIENT OUTREACH (OUTPATIENT)
Dept: OBGYN CLINIC | Facility: CLINIC | Age: 39
End: 2019-12-04

## 2019-12-04 DIAGNOSIS — Z30.013 ENCOUNTER FOR INITIAL PRESCRIPTION OF INJECTABLE CONTRACEPTIVE: Primary | ICD-10-CM

## 2019-12-04 DIAGNOSIS — Z78.9 NEED FOR FOLLOW-UP BY SOCIAL WORKER: Primary | ICD-10-CM

## 2019-12-04 RX ORDER — MEDROXYPROGESTERONE ACETATE 150 MG/ML
150 INJECTION, SUSPENSION INTRAMUSCULAR
Qty: 1 ML | Refills: 3 | Status: SHIPPED | OUTPATIENT
Start: 2019-12-04 | End: 2020-10-19

## 2019-12-04 NOTE — PROGRESS NOTES
Patient desires to change to Depo, patient spoke with Belinda Ramos  today  will send Depo-Provera 150 mg dispense 1 vial, 3 refills  Patient to return to office for Depo injection

## 2019-12-04 NOTE — PROGRESS NOTES
MANN spoke with Pt via phone to discussed [facility] for IUD  Pt was interested on Nexlannon but has no medical insurance  Pt not interested on IUD  Pt expressed concern over taking a pill daily  MANN reminded Pt the depo provera option  Pt receptive and asked MANN to inform CRNP of her decision  MANN called MARIA E Israel and relate Pt's message  Provider will call [facility] to [facility]   MANN called pt and advice to  medication this evening and to call [facility] to schedule injection appointment for tomorrow  Pt altagracia understanding and thanks MANN for her intervention

## 2019-12-05 ENCOUNTER — CLINICAL SUPPORT (OUTPATIENT)
Dept: OBGYN CLINIC | Facility: CLINIC | Age: 39
End: 2019-12-05

## 2019-12-05 DIAGNOSIS — Z30.013 ENCOUNTER FOR INITIAL PRESCRIPTION OF INJECTABLE CONTRACEPTIVE: Primary | ICD-10-CM

## 2019-12-05 PROCEDURE — 96372 THER/PROPH/DIAG INJ SC/IM: CPT

## 2019-12-05 RX ORDER — MEDROXYPROGESTERONE ACETATE 150 MG/ML
150 INJECTION, SUSPENSION INTRAMUSCULAR ONCE
Status: COMPLETED | OUTPATIENT
Start: 2019-12-05 | End: 2019-12-05

## 2019-12-05 RX ADMIN — MEDROXYPROGESTERONE ACETATE 150 MG: 150 INJECTION, SUSPENSION INTRAMUSCULAR at 15:35

## 2019-12-05 NOTE — PATIENT INSTRUCTIONS
Medroxiprogesterona (Por inyección)   Yumiko un embarazo  También sirve para tratar la endometriosis y se [place] con otros medicamentos para ayudar a [employer] de cáncer, incluyendo el cáncer de Amy Ferrera o de [place]  Zulema(s) : Depo-Provera, Depo-Provera Contraceptive, Depo-SubQ Provera 104   Existen muchas otras marcas de [place]  Luz Maria medicamento no debe ser usado cuando:   Luz Maria medicamento no es adecuado para todas las personas  No lo reciba si usted ha tenido kennedy reacción alérgica a la medroxiprogesterona o si usted tiene antecedentes de cáncer de seno o coágulos de gloria (incluyendo ataque al corazón o un derrame cerebral)  En la [employer], no debe usar luz maria medicamento aida el Sammi Bedoya  Forma de usar luz maria medicamento:   Inyectable  · Francy Duane enfermera u otro médico le administrará luz maria medicamento  Luz Maria medicamento se administra loreta kennedy inyección en un músculo o debajo de la piel  · Tenorio horario exacto del tratamiento depende de la razón por la cual usted está usando luz maria medicamento  Tenorio médico le explicará tenorio horario personal    ¨ Para tratamiento de los síntomas de cáncer, usted puede comenzar con kennedy inyección kennedy vez por semana  Es posible que necesite menos inyecciones a medida que avance tenorio tratamiento  ¨ Para control de natalidad o endometriosis, usted necesitará kennedy inyección cada 3 meses (13 semanas)  Kim y siga las instrucciones para el paciente que vienen con el medicamento  Hable con tenorio médico o farmacéutico si tiene alguna pregunta  ¨ Puede que necesite recibir tenorio primera [employer] primeros 5 días de tenorio periodo menstrual normal, para asegurarse de que no está embarazada  Si usted acaba de tener un bebé, puede recibir kennedy inyección 5 días después del parto si no está dando de lactar o 6 semanas después del parto si está dando de lactar  · Si olvida kennedy dosis: Debe recibir kennedy inyección cada 3 meses si usted quiere evitar el embarazo   Hable con tenorio médico o farmacéutico si usted no recibe beck medicamento a tiempo, porque usted puede necesitar otra forma de control de natalidad  Medicamentos y [employer] que debe evitar:   Consulte con beck médico o farmacéutico antes de usar cualquier medicamento, incluyendo los que compra sin receta médica, las vitaminas y los productos herbales  · Algunos alimentos y medicamentos pueden afectar la eficacia de medroxiprogesterona  Informe a beck médico si está usando cualquiera de los siguientes:  ¨ Aminoglutetimida, bosentan, carbamazepina, felbamato, griseofulvina, nefazodona, oxcarbazepina, fenobarbital, fenitoína, rifabutina, rifampicina, rifapentina, hierba de [address] Medicamento para el tratamiento de kennedy infección (incluyendo claritromicina, itraconazol, ketoconazol, telitromicina, voriconazol)  ¨ Medicamentos para tratar el VIH / Hollace Sharps (incluyendo atazanavir, indinavir, nelfinavir, ritonavir, saquinavir)  Precauciones aida el uso de justo medicamento:   · Informe a beck médico de inmediato si usted sonja que quedó [place]  · Informe a beck médico si usted está dando de lactar o si tiene enfermedad del hígado, enfermedad del riñón, asma, diabetes, enfermedades del corazón, convulsiones, sherri de juan por migraña, un trastorno alimenticio, osteoporosis o antecedentes de depresión  Dígale a beck médico si usted fuma  · Justo medicamento puede provocarle los siguientes problemas:  ¨ Coágulos de [place], lo que podría llevar a un derrame cerebral, ataque al corazón u otros problemas graves  ¨ Posible aumento del riesgo de cáncer de seno  ¨ Huesos débiles o delgados, especialmente con el uso a lita plazo  · Usted no debe usar justo medicamento loreta método anticonceptivo a Traci Fidencio a menos que usted no pueda usar ningún otro método anticonceptivo    · Justo medicamento no la protegerá contra el IKERH / Dahlia Sotelo u otras enfermedades de transmisión sexual   · Dígale a todo médico o dentista encargado de atenderle que usted Abreu usando [place]  Puede que justo medicamento afecte algunos resultados de [place]  · Beck médico tendrá que revisar beck progreso y los efectos de justo medicamento aida julia citas regulares  Cumpla sin falta con todas julia citas médicas  Asista a todas julia citas  Efectos secundarios que pueden presentarse aida el uso de justo medicamento:   Consulte inmediatamente con el médico si nota cualquiera de estos efectos secundarios:  · Reacción alérgica: Comezón o ronchas, hinchazón del ronald o las maki, hinchazón u hormigueo en la boca o garganta, opresión en el pecho, dificultad para respirar  · Dolor en el pecho, dificultad para respirar, o toser gloria  · [employer] o heces pálidas, náuseas, vómitos, falta de apetito, dolor estomacal, coloración amarillenta en la piel u ojos  · Sangrado vaginal abundante o que no para  · Perdida de la visión o visión doble  · Adormecimiento o debilidad en un lado del cuerpo, dolor de juan repentino o severo, problemas con el habla, la visión o para caminar  · Dolor severo de estómago o calambres  Consulte con el médico si nota los siguientes efectos secundarios menos graves:   · Dolor de juan  · Períodos mensuales ligeros o falta del periodo, manchado entre períodos  · Nerviosismo o mareos  · Dolor, enrojecimiento, ardor, inflamación, o un bulto bajo la piel donde se aplicó la inyección  · Aumento de peso  Consulte con el médico si nota otros efectos secundarios que sonja son causados por justo medicamento  Llame a beck médico para consultarle Yolanda Galvez puede notificar julia efectos secundarios al FDA al [id number]  © 2017 [address] Information is for End User's use only and may not be sold, redistributed or otherwise used for commercial purposes  Esta información es sólo para uso en educación  Beck intención no es darle un consejo médico sobre enfermedades o tratamientos   Colsulte con beck [facility] farmacéutico antes de seguir cualquier régimen médico para saber si es seguro y efectivo para usted

## 2019-12-05 NOTE — PROGRESS NOTES
Depo-Provera      [x]   Patient provided box yes   o 2 Refills remain   Last  Annual Date: 11/19/2019   Last Depo date: first injection   Side effects: no   HCG: yes  o if applicable: negative   Given by: EV   Site: right deltoid     Calcium supplement daily teaching, condoms for 2 weeks following first injection dose

## 2020-02-20 ENCOUNTER — CLINICAL SUPPORT (OUTPATIENT)
Dept: OBGYN CLINIC | Facility: CLINIC | Age: 40
End: 2020-02-20

## 2020-02-20 DIAGNOSIS — Z30.42 ENCOUNTER FOR SURVEILLANCE OF INJECTABLE CONTRACEPTIVE: Primary | ICD-10-CM

## 2020-02-20 LAB — SL AMB POCT URINE HCG: NEGATIVE

## 2020-02-20 PROCEDURE — 81025 URINE PREGNANCY TEST: CPT

## 2020-02-20 PROCEDURE — 96372 THER/PROPH/DIAG INJ SC/IM: CPT

## 2020-02-20 RX ORDER — MEDROXYPROGESTERONE ACETATE 150 MG/ML
150 INJECTION, SUSPENSION INTRAMUSCULAR ONCE
Status: COMPLETED | OUTPATIENT
Start: 2020-02-20 | End: 2020-02-20

## 2020-02-20 RX ADMIN — MEDROXYPROGESTERONE ACETATE 150 MG: 150 INJECTION, SUSPENSION INTRAMUSCULAR at 14:11

## 2020-02-20 NOTE — PATIENT INSTRUCTIONS
Medroxiprogesterona (Por inyección)   Yumiko un embarazo  También sirve para tratar la endometriosis y se [place] con otros medicamentos para ayudar a [employer] de cáncer, incluyendo el cáncer de Latonya Tony o de [place]  Zulema(s) : Depo-Provera, Depo-Provera Contraceptive, Depo-SubQ Provera 104   Existen muchas otras marcas de [place]  Luz Maria medicamento no debe ser usado cuando:   Luz Maria medicamento no es adecuado para todas las personas  No lo reciba si usted ha tenido kennedy reacción alérgica a la medroxiprogesterona o si usted tiene antecedentes de cáncer de seno o coágulos de gloria (incluyendo ataque al corazón o un derrame cerebral)  En la [employer], no debe usar luz maria medicamento aida el [facility]  Forma de usar luz maria medicamento:   Inyectable  · Clorinda Matsu enfermera u otro médico le administrará luz maria medicamento  Luz Maria medicamento se administra loreta kennedy inyección en un músculo o debajo de la piel  · Beck horario exacto del tratamiento depende de la razón por la cual usted está usando luz maria medicamento  Beck médico le explicará beck horario personal    ¨ Para tratamiento de los síntomas de cáncer, usted puede comenzar con kennedy inyección kennedy vez por semana  Es posible que necesite menos inyecciones a medida que avance beck tratamiento  ¨ Para control de natalidad o endometriosis, usted necesitará kennedy inyección cada 3 meses (13 semanas)  Kim y siga las instrucciones para el paciente que vienen con el medicamento  Hable con beck médico o farmacéutico si tiene alguna pregunta  ¨ Puede que necesite recibir beck primera [employer] primeros 5 días de beck periodo menstrual normal, para asegurarse de que no está embarazada  Si usted acaba de tener un bebé, puede recibir kennedy inyección 5 días después del parto si no está dando de lactar o 6 semanas después del parto si está dando de lactar  · Si olvida kennedy dosis: Debe recibir kennedy inyección cada 3 meses si usted quiere evitar el embarazo   Hable con beck médico o farmacéutico si usted no recibe beck medicamento a tiempo, porque usted puede necesitar otra forma de control de natalidad  Medicamentos y [employer] que debe evitar:   Consulte con beck médico o farmacéutico antes de usar cualquier medicamento, incluyendo los que compra sin receta médica, las vitaminas y los productos herbales  · Algunos alimentos y medicamentos pueden afectar la eficacia de medroxiprogesterona  Informe a beck médico si está usando cualquiera de los siguientes:  ¨ Aminoglutetimida, bosentan, carbamazepina, felbamato, griseofulvina, nefazodona, oxcarbazepina, fenobarbital, fenitoína, rifabutina, rifampicina, rifapentina, hierba de [address] Medicamento para el tratamiento de kennedy infección (incluyendo claritromicina, itraconazol, ketoconazol, telitromicina, voriconazol)  ¨ Medicamentos para tratar el VIH / Trudi Liam (incluyendo atazanavir, indinavir, nelfinavir, ritonavir, saquinavir)  Precauciones aida el uso de justo medicamento:   · Informe a beck médico de inmediato si usted sonja que quedó [place]  · Informe a beck médico si usted está dando de lactar o si tiene enfermedad del hígado, enfermedad del riñón, asma, diabetes, enfermedades del corazón, convulsiones, sherri de juan por migraña, un trastorno alimenticio, osteoporosis o antecedentes de depresión  Dígale a beck médico si usted fuma  · Justo medicamento puede provocarle los siguientes problemas:  ¨ Coágulos de [place], lo que podría llevar a un derrame cerebral, ataque al corazón u otros problemas graves  ¨ Posible aumento del riesgo de cáncer de seno  ¨ Huesos débiles o delgados, especialmente con el uso a lita plazo  · Usted no debe usar justo medicamento loreta método anticonceptivo a Neely Carrow a menos que usted no pueda usar ningún otro método anticonceptivo    · Justo medicamento no la protegerá contra el VIH / Trudi Liam u otras enfermedades de transmisión sexual   · Dígale a todo médico o dentista encargado de atenderle que usted Abreu usando [place]  Puede que justo medicamento afecte algunos resultados de [employer]  · Beck médico tendrá que revisar beck progreso y los efectos de justo medicamento aida julia citas regulares  Cumpla sin falta con todas julia citas médicas  Asista a todas julia citas  Efectos secundarios que pueden presentarse aida el uso de justo medicamento:   Consulte inmediatamente con el médico si nota cualquiera de estos efectos secundarios:  · Reacción alérgica: Comezón o ronchas, hinchazón del ronald o las maki, hinchazón u hormigueo en la boca o garganta, opresión en el pecho, dificultad para respirar  · Dolor en el pecho, dificultad para respirar, o toser gloria  · [employer] o heces pálidas, náuseas, vómitos, falta de apetito, dolor estomacal, coloración amarillenta en la piel u ojos  · Sangrado vaginal abundante o que no para  · Perdida de la visión o visión doble  · Adormecimiento o debilidad en un lado del cuerpo, dolor de juan repentino o severo, problemas con el habla, la visión o para caminar  · Dolor severo de estómago o calambres  Consulte con el médico si nota los siguientes efectos secundarios menos graves:   · Dolor de juan  · Períodos mensuales ligeros o falta del periodo, manchado entre períodos  · Nerviosismo o mareos  · Dolor, enrojecimiento, ardor, inflamación, o un bulto bajo la piel donde se aplicó la inyección  · Aumento de peso  Consulte con el médico si nota otros efectos secundarios que sonja son causados por justo medicamento  Llame a beck médico para consultarle Yolanda Galvez puede notificar julia efectos secundarios al FDA al [phone]  © 2017 [facility] Information is for End User's use only and may not be sold, redistributed or otherwise used for commercial purposes  Esta información es sólo para uso en educación  Beck intención no es darle un consejo médico sobre enfermedades o tratamientos   Colsulte con beck Aleida Horde farmacéutico antes de seguir cualquier régimen médico para saber si es seguro y efectivo para usted

## 2020-02-20 NOTE — PROGRESS NOTES
Depo-Provera      [x]   Patient provided box yes   o 2 Refills remain   Last  Annual Date: 11/19/2019  Alessandro Bones Last Depo date: 12/5/2019   Side effects: no   HCG: yes  o if applicable: negative   Given by: EV   Site: left deltoid        Calcium supplement daily teaching, condoms for 2 weeks following first injection dose

## 2020-05-07 ENCOUNTER — CLINICAL SUPPORT (OUTPATIENT)
Dept: OBGYN CLINIC | Facility: CLINIC | Age: 40
End: 2020-05-07

## 2020-05-07 DIAGNOSIS — Z30.42 ENCOUNTER FOR DEPO-PROVERA CONTRACEPTION: Primary | ICD-10-CM

## 2020-05-07 PROCEDURE — 96372 THER/PROPH/DIAG INJ SC/IM: CPT

## 2020-05-07 RX ORDER — MEDROXYPROGESTERONE ACETATE 150 MG/ML
150 INJECTION, SUSPENSION INTRAMUSCULAR ONCE
Status: COMPLETED | OUTPATIENT
Start: 2020-05-07 | End: 2020-05-07

## 2020-05-07 RX ADMIN — MEDROXYPROGESTERONE ACETATE 150 MG: 150 INJECTION, SUSPENSION INTRAMUSCULAR at 14:05

## 2020-07-27 ENCOUNTER — CLINICAL SUPPORT (OUTPATIENT)
Dept: OBGYN CLINIC | Facility: CLINIC | Age: 40
End: 2020-07-27

## 2020-07-27 DIAGNOSIS — Z30.42 ENCOUNTER FOR MANAGEMENT AND INJECTION OF DEPO-PROVERA: Primary | ICD-10-CM

## 2020-07-27 PROCEDURE — 96372 THER/PROPH/DIAG INJ SC/IM: CPT

## 2020-07-27 RX ORDER — MEDROXYPROGESTERONE ACETATE 150 MG/ML
150 INJECTION, SUSPENSION INTRAMUSCULAR ONCE
Status: COMPLETED | OUTPATIENT
Start: 2020-07-27 | End: 2020-07-27

## 2020-07-27 RX ADMIN — MEDROXYPROGESTERONE ACETATE 150 MG: 150 INJECTION, SUSPENSION INTRAMUSCULAR at 14:56

## 2020-07-27 NOTE — PROGRESS NOTES
Depo-Provera      [x]   Patient provided box yes  o 0 Refills remain   Last  Annual Date: 11/19/19   Last Depo date: 5/7/20   Side effects: no   HCG: no   Given by: Agus Woodard CMA   Site: left buttock     Calcium supplement daily teaching, condoms for 2 weeks following first injection dose

## 2020-10-19 DIAGNOSIS — Z30.013 ENCOUNTER FOR INITIAL PRESCRIPTION OF INJECTABLE CONTRACEPTIVE: ICD-10-CM

## 2020-10-19 RX ORDER — MEDROXYPROGESTERONE ACETATE 150 MG/ML
150 INJECTION, SUSPENSION INTRAMUSCULAR
Qty: 1 ML | Refills: 0 | Status: SHIPPED | OUTPATIENT
Start: 2020-10-19 | End: 2021-01-15 | Stop reason: ALTCHOICE

## 2021-01-09 ENCOUNTER — HOSPITAL ENCOUNTER (EMERGENCY)
Facility: HOSPITAL | Age: 41
Discharge: HOME | End: 2021-01-09
Attending: EMERGENCY MEDICINE

## 2021-01-09 VITALS
HEART RATE: 81 BPM | BODY MASS INDEX: 24.11 KG/M2 | SYSTOLIC BLOOD PRESSURE: 135 MMHG | RESPIRATION RATE: 16 BRPM | OXYGEN SATURATION: 100 % | TEMPERATURE: 98.1 F | DIASTOLIC BLOOD PRESSURE: 100 MMHG | WEIGHT: 150 LBS | HEIGHT: 66 IN

## 2021-01-09 DIAGNOSIS — H40.9 GLAUCOMA: Primary | ICD-10-CM

## 2021-01-09 DIAGNOSIS — H04.302 DACRYOCYSTITIS OF LEFT LACRIMAL SAC: ICD-10-CM

## 2021-01-09 PROCEDURE — 99283 EMERGENCY DEPT VISIT LOW MDM: CPT

## 2021-01-09 PROCEDURE — 99284 EMERGENCY DEPT VISIT MOD MDM: CPT | Performed by: PHYSICIAN ASSISTANT

## 2021-01-09 RX ORDER — ACETAZOLAMIDE 250 MG/1
250 TABLET ORAL EVERY 12 HOURS SCHEDULED
Status: DISCONTINUED | OUTPATIENT
Start: 2021-01-09 | End: 2021-01-09 | Stop reason: HOSPADM

## 2021-01-09 RX ORDER — TIMOLOL MALEATE 5 MG/ML
1 SOLUTION/ DROPS OPHTHALMIC ONCE
Status: COMPLETED | OUTPATIENT
Start: 2021-01-09 | End: 2021-01-09

## 2021-01-09 RX ORDER — ACETAZOLAMIDE 500 MG/1
500 CAPSULE, EXTENDED RELEASE ORAL 2 TIMES DAILY
Qty: 20 CAPSULE | Refills: 0 | Status: SHIPPED | OUTPATIENT
Start: 2021-01-09

## 2021-01-09 RX ORDER — TETRACAINE HYDROCHLORIDE 5 MG/ML
1 SOLUTION OPHTHALMIC ONCE
Status: COMPLETED | OUTPATIENT
Start: 2021-01-09 | End: 2021-01-09

## 2021-01-09 RX ORDER — IBUPROFEN 600 MG/1
600 TABLET ORAL ONCE
Status: COMPLETED | OUTPATIENT
Start: 2021-01-09 | End: 2021-01-09

## 2021-01-09 RX ORDER — LATANOPROST 50 UG/ML
1 SOLUTION/ DROPS OPHTHALMIC
Status: COMPLETED | OUTPATIENT
Start: 2021-01-09 | End: 2021-01-09

## 2021-01-09 RX ORDER — TOBRAMYCIN AND DEXAMETHASONE 3; 1 MG/ML; MG/ML
1 SUSPENSION/ DROPS OPHTHALMIC 2 TIMES DAILY
Qty: 5 ML | Refills: 0 | Status: SHIPPED | OUTPATIENT
Start: 2021-01-09

## 2021-01-09 RX ORDER — ACETAZOLAMIDE 250 MG/1
250 TABLET ORAL ONCE
Status: COMPLETED | OUTPATIENT
Start: 2021-01-09 | End: 2021-01-09

## 2021-01-09 RX ADMIN — TETRACAINE HYDROCHLORIDE 1 DROP: 5 SOLUTION OPHTHALMIC at 18:18

## 2021-01-09 RX ADMIN — ACETAZOLAMIDE 250 MG: 250 TABLET ORAL at 21:21

## 2021-01-09 RX ADMIN — TIMOLOL MALEATE 1 DROP: 5 SOLUTION/ DROPS OPHTHALMIC at 19:20

## 2021-01-09 RX ADMIN — LATANOPROST 1 DROP: 50 SOLUTION OPHTHALMIC at 20:00

## 2021-01-09 RX ADMIN — IBUPROFEN 600 MG: 600 TABLET, FILM COATED ORAL at 21:21

## 2021-01-09 RX ADMIN — FLUORESCEIN SODIUM 1 STRIP: 1 STRIP OPHTHALMIC at 18:18

## 2021-01-09 RX ADMIN — ACETAZOLAMIDE 250 MG: 250 TABLET ORAL at 19:20

## 2021-01-09 NOTE — ED PROVIDER NOTES
History  Chief Complaint   Patient presents with    Eye Pain     patient here with c/o left eye pain and discharge  Patent states pain present for 6 months, getting worse  Saw PCP a few months ago and weas told it was an infecrtion, no medciation prescribed  Patient has not seen Opthalmology  38year old female coming in today with 3 weeks of L eye pain medial lower eye and blurred vision x 3 weeks  Didn't come in sooner because of lack of insurance  She reports certain lights seem to bother her eye more  Reports tearing from both eyes  No significant discharge  None       History reviewed  No pertinent past medical history  History reviewed  No pertinent surgical history  History reviewed  No pertinent family history  I have reviewed and agree with the history as documented  E-Cigarette/Vaping    E-Cigarette Use Never User      E-Cigarette/Vaping Substances     Social History     Tobacco Use    Smoking status: Never Smoker    Smokeless tobacco: Never Used   Substance Use Topics    Alcohol use: Yes     Frequency: Monthly or less     Drinks per session: 1 or 2    Drug use: Never       Review of Systems   Constitutional: Negative for fever  HENT: Negative for nosebleeds  Eyes: Positive for photophobia, pain, discharge (tearing) and visual disturbance  Negative for redness  Respiratory: Negative for shortness of breath  Cardiovascular: Negative for chest pain  Gastrointestinal: Negative for blood in stool  Genitourinary: Negative for hematuria  Musculoskeletal: Negative for gait problem  Skin: Negative for rash  Neurological: Negative for seizures  Psychiatric/Behavioral: Negative for behavioral problems  Physical Exam  Physical Exam  Constitutional:       Appearance: Normal appearance  HENT:      Head: Normocephalic and atraumatic  Nose: No rhinorrhea        Mouth/Throat:      Mouth: Mucous membranes are moist    Eyes:      Intraocular pressure: Right eye pressure is 17 mmHg  Left eye pressure is 47 mmHg  Measurements were taken using a handheld tonometer  Extraocular Movements: Extraocular movements intact  Comments: OD 20/50 OS20/100 +1 OU 20/50  L Hazy Cornea  Mild erythema over L lacrimal duct  Pupils reactive, though L is slightly more dilated than right   Neck:      Musculoskeletal: Normal range of motion  Pulmonary:      Effort: Pulmonary effort is normal    Musculoskeletal: Normal range of motion  Skin:     General: Skin is warm and dry  Neurological:      General: No focal deficit present  Mental Status: She is alert     Psychiatric:         Mood and Affect: Mood normal          Behavior: Behavior normal          Vital Signs  ED Triage Vitals   Temperature Pulse Respirations Blood Pressure SpO2   01/09/21 1746 01/09/21 1746 01/09/21 1746 01/09/21 1746 01/09/21 1746   98 1 °F (36 7 °C) 80 16 139/99 98 %      Temp src Heart Rate Source Patient Position - Orthostatic VS BP Location FiO2 (%)   -- 01/09/21 1746 01/09/21 1746 01/09/21 1746 --    Monitor Sitting Right arm       Pain Score       01/09/21 2108       5           Vitals:    01/09/21 1746 01/09/21 2108   BP: 139/99 135/100   Pulse: 80 81   Patient Position - Orthostatic VS: Sitting Sitting         Visual Acuity      ED Medications  Medications   acetaZOLAMIDE (DIAMOX) tablet 250 mg (250 mg Oral Given 1/9/21 1920)   acetaZOLAMIDE (DIAMOX) tablet 250 mg (has no administration in time range)   ibuprofen (MOTRIN) tablet 600 mg (has no administration in time range)   tetracaine 0 5 % ophthalmic solution 1 drop (1 drop Left Eye Given 1/9/21 1818)   fluorescein sodium sterile ophthalmic strip 1 strip (1 strip Left Eye Given 1/9/21 1818)   timolol (TIMOPTIC) 0 5 % ophthalmic solution 1 drop (1 drop Left Eye Given 1/9/21 1920)   latanoprost (XALATAN) 0 005 % ophthalmic solution 1 drop (1 drop Left Eye Given 1/9/21 2000)       Diagnostic Studies  Results Reviewed     None                 No orders to display              Procedures  Procedures         ED Course  ED Course as of Jan 09 2128   Sat Jan 09, 2021   Jason 1978 Dr Ngozi Abarca on the phone, ophtho      1842 Timolol, diamox, latanaprost now and again in half hour  And recheck pressure in an hour, if it is doing better, can likely go home with drops and diamox and follow-up in office on Monday 2033 Rechecked the pressure L eye, 35-38       2038 Latanoprost once more tonight, timolol BID, diamox first thing in the morning and send a Rx BID  Tobradex BID  Latanoprost before bed  If getting worse, have ED tiger-text him  Call office Monday  Put drop in eye and close eye for 2 minutes  5 minutes between drops  MDM  Number of Diagnoses or Management Options  Dacryocystitis of left lacrimal sac:   Glaucoma:       Disposition  Final diagnoses:   Glaucoma   Dacryocystitis of left lacrimal sac     Time reflects when diagnosis was documented in both MDM as applicable and the Disposition within this note     Time User Action Codes Description Comment    1/9/2021  8:52 PM Wen Edward Add [H40 9] Glaucoma     1/9/2021  8:52 PM Wen Edward Add [P01 114] Dacryocystitis of left lacrimal sac       ED Disposition     ED Disposition Condition Date/Time Comment    Discharge Stable Sat Jan 9, 2021  8:51 PM Natasha SIDHU [facility] discharge to home/self care              Follow-up Information     Follow up With Specialties Details Why Calin Cox MD Ophthalmology Schedule an appointment as soon as possible for a visit   [address] [place] [address]  [phone]            Patient's Medications   Discharge Prescriptions    ACETAZOLAMIDE (DIAMOX) 500 MG CAPSULE    Take 1 capsule (500 mg total) by mouth 2 (two) times a day       Start Date: 1/9/2021  End Date: --       Order Dose: 500 mg       Quantity: 20 capsule    Refills: 0    TOBRAMYCIN-DEXAMETHASONE (TOBRADEX) OPHTHALMIC SUSPENSION    Administer 1 drop into the left eye 2 (two) times a day       Start Date: 1/9/2021  End Date: --       Order Dose: 1 drop       Quantity: 5 mL    Refills: 0     No discharge procedures on file      PDMP Review     None          ED Provider  Electronically Signed by           Aron Kearney PA-C  01/09/21 1407

## 2021-01-09 NOTE — ED NOTES
Will be sending latanoprost from [facility], RN  01/09/21 Kimberely Agustin - [facility]

## 2021-01-15 ENCOUNTER — OFFICE VISIT (OUTPATIENT)
Dept: OBGYN CLINIC | Facility: CLINIC | Age: 41
End: 2021-01-15

## 2021-01-15 VITALS
DIASTOLIC BLOOD PRESSURE: 89 MMHG | HEART RATE: 90 BPM | BODY MASS INDEX: 27.59 KG/M2 | SYSTOLIC BLOOD PRESSURE: 127 MMHG | WEIGHT: 157 LBS

## 2021-01-15 DIAGNOSIS — Z30.09 BIRTH CONTROL COUNSELING: Primary | ICD-10-CM

## 2021-01-15 LAB — SL AMB POCT URINE HCG: NEGATIVE

## 2021-01-15 PROCEDURE — 81025 URINE PREGNANCY TEST: CPT | Performed by: NURSE PRACTITIONER

## 2021-01-15 PROCEDURE — 99213 OFFICE O/P EST LOW 20 MIN: CPT | Performed by: NURSE PRACTITIONER

## 2021-01-15 RX ORDER — TOBRAMYCIN AND DEXAMETHASONE 3; 1 MG/ML; MG/ML
SUSPENSION/ DROPS OPHTHALMIC
COMMUNITY
Start: 2021-01-10 | End: 2022-06-02

## 2021-01-15 RX ORDER — ACETAZOLAMIDE 500 MG/1
CAPSULE, EXTENDED RELEASE ORAL
COMMUNITY
Start: 2021-01-10 | End: 2021-01-29 | Stop reason: SDUPTHER

## 2021-01-15 RX ORDER — ACETAMINOPHEN AND CODEINE PHOSPHATE 120; 12 MG/5ML; MG/5ML
1 SOLUTION ORAL DAILY
Qty: 28 TABLET | Refills: 0 | Status: SHIPPED | OUTPATIENT
Start: 2021-01-15 | End: 2021-01-29 | Stop reason: SDUPTHER

## 2021-01-15 NOTE — PROGRESS NOTES
Assessment/Plan:    No problem-specific Assessment & Plan notes found for this encounter  Return to office for annual gyn exam, Pap is due in      Diagnoses and all orders for this visit:    Birth control counseling  -     POCT urine HCG- UPT is negative today  -     Reviewed all options and patient currently would like to start  Progesterone only pill today  norethindrone (MICRONOR) 0 35 MG tablet; Take 1 tablet (0 35 mg total) by mouth daily for 28 days  Reviewed need to take a pill every day at the same time cannot be off by 3 hours  Reviewed backup method for the 1st pill pack  Reviewed irregular bleeding pattern with progesterone only pills  Pt interested in Mirena IUD and information provided in [place] patient to call if she desires and then would see if patient qualifies for free Mirena IUD through [employer]  Other orders  -     acetaZOLAMIDE (DIAMOX) 500 mg capsule; ELICEO KAISER A  -     tobramycin-dexamethasone (TOBRADEX) ophthalmic suspension; ADMINISTER 1 DROP INTO THE LEFT EYE 2 (TWO) TIMES A DAY          Subjective:      Patient ID: Kelsey Bower is a 38 y o  female  HPI 79-year-old  014   used [id number]  Patient has been on Depo for 1 year but reports she has weight gain and would like to stop use  Patient's weight today is 157 lb,  Patient reports she has gained about 25 lb  She is amenorrheic with use but her  LMP was 8-9 months ago  Her last Depo appointment was  2020 UPT today is negative  reviewed options and patient is interested in progestin only pills  She is also interested in Mirena IUD  Currently she was without insurance,  Would need to see if she qualifies for free Mirena IUD through [employer]  Patient's history of glaucoma    She is a nonsmoker    The following portions of the patient's history were reviewed and updated as appropriate: allergies, current medications, past family history, past medical history, past social history, past surgical history and problem list     Review of Systems   Constitutional: Negative for chills and fever  HENT: Negative for congestion  Eyes: Negative for photophobia and visual disturbance  Respiratory: Negative for cough and shortness of breath  Gastrointestinal: Negative for abdominal pain  Genitourinary: Negative for dysuria, menstrual problem, vaginal bleeding and vaginal discharge  Neurological: Negative for dizziness and headaches  Objective:      /89 (BP Location: Left arm, Patient Position: Sitting, Cuff Size: Adult)   Pulse 90   Wt 71 2 kg (157 lb)   BMI 27 59 kg/m²          Physical Exam  Constitutional:       Appearance: Normal appearance  Cardiovascular:      Rate and Rhythm: Normal rate and regular rhythm  Pulmonary:      Effort: Pulmonary effort is normal    Abdominal:      Palpations: Abdomen is soft  Tenderness: There is no abdominal tenderness  Skin:     General: Skin is warm and dry  Neurological:      Mental Status: She is oriented to person, place, and time     Psychiatric:         Mood and Affect: Mood normal          Behavior: Behavior normal

## 2021-01-15 NOTE — PATIENT INSTRUCTIONS
Covid - 19 instructions    If you are having any of the following     Cough   Shortness of breath   Fever  If traveled internationally or to high risk [place] states  Or been in contact with someone that has     Please call:    [phone]  option 7    They will screen you and direct you to the nearest testing location     Should not come to the PCP or OB office without calling that number first

## 2021-01-28 NOTE — PROGRESS NOTES
ASSESSMENT & PLAN: Beltran Johnson is a 38 y o  [id number] with normal gynecologic exam     1   Routine well woman exam done today  2  Pap and HPV:  The patient's last pap and hpv was 2017  It was normal     Pap and cotesting was not done today  Current ASCCP Guidelines reviewed  Due in   3  Mammogram ordered- 1st mammogram- to get a free mammogram through [facility],  Patient placed on list and mammogram request given to patient  4  The following were reviewed in today's visit: breast self exam, STD testing, use and side effects of OCPs, osteoporosis, adequate intake of calcium and vitamin D, exercise and healthy diet  5  Micronor for contraception  Patient desires to continue, refills x1 year sent to pharmacy  Review to take every day at the same time    BMI Counseling: Body mass index is 26 46 kg/m²  The BMI is above normal  Nutrition recommendations include 3-5 servings of fruits/vegetables daily, consuming healthier snacks and moderation in carbohydrate intake  Exercise recommendations include exercising 3-5 times per week  CC:  Annual Gynecologic Examination    HPI: Beltran Johnson is a 38 y o  [id number] who presents for annual gynecologic examination  She was started on Micronor 1 pill daily for contraception when seen on 01/15/2021 and interested in [place] [address] IUD for contraception  she denies abnormal Pap history  Health Maintenance:    She wears her seatbelt routinely  She does perform regular monthly self breast exams  She feels safe at home       Patient Active Problem List   Diagnosis    S/P  section    Positive urine pregnancy test    Encounter for gynecological examination with abnormal finding    Negative pregnancy test    Encounter for initial prescription of contraceptive pills    Amenorrhea    Glaucoma of left eye    Eye tearing, right       Past Medical History:   Diagnosis Date    Acid reflux     with pregnancy    Heart palpitations     with pregnancy    Urinary tract infection     with pregnancy    Varicella        Past Surgical History:   Procedure Laterality Date    DILATION AND CURETTAGE OF UTERUS      x2    LA  DELIVERY ONLY N/A 2017    Procedure:  SECTION (); Surgeon: Dion Lazaro DO;  Location: [facility] ;  Service: Obstetrics       Past OB/Gyn History:  OB History        5    Para   4    Term   4            AB   1    Living   4       SAB   1    TAB        Ectopic        Multiple   0    Live Births   4                  Pt does not have menstrual issues     History of sexually transmitted infection: No   History of abnormal pap smears: No      Patient is currently sexually active  heterosexual  The current method of family planning is oral progesterone-only contraceptive      Family History   Problem Relation Age of Onset    No Known Problems Mother     No Known Problems Father     No Known Problems Sister     No Known Problems Brother     No Known Problems Daughter     Eczema Son     No Known Problems Sister     No Known Problems Sister     No Known Problems Daughter     No Known Problems Daughter        Social History:  Social History     Socioeconomic History    Marital status: /Civil Union     Spouse name: Not on file    Number of children: 3    Years of education: Not on file    Highest education level: Not on file   Occupational History    Not on file   Social Needs    Financial resource strain: Not hard at all   [employer] insecurity     Worry: Never true     Inability: Never true    Transportation needs     Medical: No     Non-medical: No   Tobacco Use    Smoking status: Never Smoker    Smokeless tobacco: Never Used   Substance and Sexual Activity    Alcohol use: No    Drug use: No    Sexual activity: Yes     Partners: Male     Birth control/protection: None   Lifestyle    Physical activity     Days per week: 2 days     Minutes per session: Not on file    Stress: Not at all   Relationships    Social connections     Talks on phone: Twice a week     Gets together: Once a week     Attends [employer] service: Never     Active member of club or organization: No     Attends meetings of clubs or organizations: Never     Relationship status:     Intimate partner violence     Fear of current or ex partner: No     Emotionally abused: No     Physically abused: No     Forced sexual activity: No   Other Topics Concern    Not on file   Social History Narrative    Not on file     Presently lives with  family  Patient is   Allergies   Allergen Reactions    Pollen Extract Allergic Rhinitis       Current Outpatient Medications:     acetaZOLAMIDE (DIAMOX) 500 mg capsule, Take 1 capsule (500 mg total) by mouth 2 (two) times a day, Disp: 60 capsule, Rfl: 0    Latanoprost 0 005 % EMUL, Administer 1 drop into the left eye daily at bedtime, Disp: 1 Bottle, Rfl: 1    norethindrone (MICRONOR) 0 35 MG tablet, Take 1 tablet (0 35 mg total) by mouth daily, Disp: 28 tablet, Rfl: 11    polyvinyl alcohol (LIQUIFILM TEARS) 1 4 % ophthalmic solution, Administer 1 drop to both eyes as needed for dry eyes, Disp: 15 mL, Rfl: 0    timolol (TIMOPTIC) 0 5 % ophthalmic solution, Administer 1 drop into the left eye 2 (two) times a day, Disp: 15 mL, Rfl: 1    tobramycin-dexamethasone (TOBRADEX) ophthalmic suspension, ADMINISTER 1 DROP INTO THE LEFT EYE 2 (TWO) TIMES A DAY, Disp: , Rfl:     Review of Systems:    Review of Systems  Constitutional :no fever, feels well, no tiredness, no recent weight gain or loss  ENT: no ear ache, no loss of hearing, no nosebleeds or nasal discharge, no sore throat or hoarseness  Cardiovascular: no complaints of slow or fast heart beat, no chest pain, no palpitations, no leg claudication or lower extremity edema    Respiratory: no complaints of shortness of shortness of breath, no FARRAR  Breasts:no complaints of breast pain, breast lump, or nipple discharge  Gastrointestinal: no complaints of abdominal pain, constipation, nausea, vomiting, or diarrhea or bloody stools  Genitourinary : no complaints of dysuria, incontinence, pelvic pain, no dysmenorrhea, vaginal discharge or abnormal vaginal bleeding and as noted in HPI  Musculoskeletal: no complaints of arthralgia, no myalgia, no joint swelling or stiffness, no limb pain or swelling  Integumentary: no complaints of skin rash or lesion, itching or dry skin  Neurological: no complaints of headache, no confusion, no numbness or tingling, no dizziness or fainting    Objective      /85 (BP Location: Left arm, Patient Position: Sitting, Cuff Size: Adult)   Pulse 82   Ht 5' 4 8" (1 646 m)   Wt 71 7 kg (158 lb)   BMI 26 46 kg/m²     General:   appears stated age, cooperative, alert normal mood and affect   Neck: normal, supple,trachea midline, no masses   Heart: regular rate and rhythm, S1, S2 normal, no murmur, click, rub or gallop   Lungs: clear to auscultation bilaterally   Breasts: normal appearance, no masses or tenderness, Inspection negative, No nipple retraction or dimpling, No nipple discharge or bleeding, No axillary or supraclavicular adenopathy, Normal to palpation without dominant masses, Taught monthly breast self examination   Abdomen: soft, non-tender, without masses or organomegaly   Vulva: normal female genitalia, Bartholin's, Urethra, [place] normal   Vagina: normal vagina, no discharge, exudate, lesion, or erythema   Urethra: normal   Cervix: Normal, no discharge  Nontender  Uterus: normal size, contour, position, consistency, mobility, non-tender and anteverted   Adnexa: normal adnexa and no mass, fullness, tenderness   Lymphatic palpation of lymph nodes in neck, axilla, groin and/or other locations: no lymphadenopathy or masses noted   Skin normal skin turgor and no rashes     Psychiatric orientation to person, place, and time: normal  mood and affect: normal

## 2021-01-29 ENCOUNTER — TELEPHONE (OUTPATIENT)
Dept: FAMILY MEDICINE CLINIC | Facility: CLINIC | Age: 41
End: 2021-01-29

## 2021-01-29 ENCOUNTER — ANNUAL EXAM (OUTPATIENT)
Dept: OBGYN CLINIC | Facility: CLINIC | Age: 41
End: 2021-01-29

## 2021-01-29 ENCOUNTER — OFFICE VISIT (OUTPATIENT)
Dept: FAMILY MEDICINE CLINIC | Facility: CLINIC | Age: 41
End: 2021-01-29

## 2021-01-29 VITALS
BODY MASS INDEX: 26.42 KG/M2 | DIASTOLIC BLOOD PRESSURE: 80 MMHG | HEART RATE: 96 BPM | TEMPERATURE: 96.2 F | WEIGHT: 158.6 LBS | SYSTOLIC BLOOD PRESSURE: 110 MMHG | OXYGEN SATURATION: 97 % | RESPIRATION RATE: 18 BRPM | HEIGHT: 65 IN

## 2021-01-29 VITALS
HEART RATE: 82 BPM | HEIGHT: 65 IN | SYSTOLIC BLOOD PRESSURE: 126 MMHG | DIASTOLIC BLOOD PRESSURE: 85 MMHG | BODY MASS INDEX: 26.33 KG/M2 | WEIGHT: 158 LBS

## 2021-01-29 DIAGNOSIS — Z30.09 BIRTH CONTROL COUNSELING: ICD-10-CM

## 2021-01-29 DIAGNOSIS — H40.9 GLAUCOMA OF LEFT EYE, UNSPECIFIED GLAUCOMA TYPE: Primary | ICD-10-CM

## 2021-01-29 DIAGNOSIS — H40.9 GLAUCOMA OF LEFT EYE, UNSPECIFIED GLAUCOMA TYPE: ICD-10-CM

## 2021-01-29 DIAGNOSIS — Z30.41 ENCOUNTER FOR SURVEILLANCE OF CONTRACEPTIVE PILLS: ICD-10-CM

## 2021-01-29 DIAGNOSIS — Z12.31 ENCOUNTER FOR SCREENING MAMMOGRAM FOR MALIGNANT NEOPLASM OF BREAST: Primary | ICD-10-CM

## 2021-01-29 DIAGNOSIS — H04.201 EYE TEARING, RIGHT: ICD-10-CM

## 2021-01-29 PROCEDURE — 99396 PREV VISIT EST AGE 40-64: CPT | Performed by: NURSE PRACTITIONER

## 2021-01-29 PROCEDURE — 99213 OFFICE O/P EST LOW 20 MIN: CPT | Performed by: FAMILY MEDICINE

## 2021-01-29 RX ORDER — ACETAMINOPHEN AND CODEINE PHOSPHATE 120; 12 MG/5ML; MG/5ML
1 SOLUTION ORAL DAILY
Qty: 28 TABLET | Refills: 11 | Status: SHIPPED | OUTPATIENT
Start: 2021-01-29 | End: 2022-06-02

## 2021-01-29 RX ORDER — ACETAZOLAMIDE 500 MG/1
500 CAPSULE, EXTENDED RELEASE ORAL 2 TIMES DAILY
Qty: 60 CAPSULE | Refills: 0 | Status: SHIPPED | OUTPATIENT
Start: 2021-01-29 | End: 2021-02-15

## 2021-01-29 RX ORDER — TIMOLOL MALEATE 5 MG/ML
1 SOLUTION/ DROPS OPHTHALMIC 2 TIMES DAILY
Qty: 15 ML | Refills: 1 | Status: SHIPPED | OUTPATIENT
Start: 2021-01-29

## 2021-01-29 RX ORDER — POLYVINYL ALCOHOL 14 MG/ML
1 SOLUTION/ DROPS OPHTHALMIC AS NEEDED
Qty: 15 ML | Refills: 0 | Status: SHIPPED | OUTPATIENT
Start: 2021-01-29

## 2021-01-29 RX ORDER — TIMOLOL MALEATE 5 MG/ML
1 SOLUTION/ DROPS OPHTHALMIC 2 TIMES DAILY
COMMUNITY
End: 2021-01-29 | Stop reason: SDUPTHER

## 2021-01-29 NOTE — ASSESSMENT & PLAN NOTE
Symptomatic x2-3 months, left eye intraocular pressure 47 mmHg in ED on 1/10/21  Symptoms improved after starting Diamox 500 mg BID, Timolol, Latanoprost  In ED was referred to ophthalmology but unable to schedule due financial and transportation limitations  Referral given for ophthalmologist Dr Kati Robins in [facility] and messaged referral specialist to assist in scheduling  Also discussed with financial counselor who was already aware of patient needs  Refilled medications and patient to follow up for annual physical in 2-4 weeks

## 2021-01-29 NOTE — PROGRESS NOTES
Assessment/Plan:    Glaucoma of left eye  Symptomatic x2-3 months, left eye intraocular pressure 47 mmHg in ED on 1/10/21  Symptoms improved after starting Diamox 500 mg BID, Timolol, Latanoprost  In ED was referred to ophthalmology but unable to schedule due financial and transportation limitations  Referral given for ophthalmologist Dr Steffanie Burris in [facility] and messaged referral specialist to assist in scheduling  Also discussed with financial counselor who was already aware of patient needs  Refilled medications and patient to follow up for annual physical in 2-4 weeks  Eye tearing, right  Right eye tearing x2 days, no other seasonal allergy symptoms  Clear discharge, minimal discomfort when palpating lacrimal duct, may be occluded  Start artificial tears  Educated on lacrimal duct massage  Follow up as needed if worsening or no improvement  Diagnoses and all orders for this visit:    Glaucoma of left eye, unspecified glaucoma type  -     Ambulatory referral to Ophthalmology; Future  -     acetaZOLAMIDE (DIAMOX) 500 mg capsule; Take 1 capsule (500 mg total) by mouth 2 (two) times a day  -     polyvinyl alcohol (LIQUIFILM TEARS) 1 4 % ophthalmic solution; Administer 1 drop to both eyes as needed for dry eyes  -     timolol (TIMOPTIC) 0 5 % ophthalmic solution; Administer 1 drop into the left eye 2 (two) times a day  -     Latanoprost 0 005 % EMUL; Administer 1 drop into the left eye daily at bedtime    Eye tearing, right            Subjective:      Patient ID: Rochelle Collins is a 38 y o  female who presents for ED follow up for left eye pain  The patient presented to ED on 01/10/2021 for severe left eye pain and change in vision for about 2-3 months  She had photophobia, hazy vision and she was seeing spots  She previously did not seek treatment because she does not have insurance  In the ED she was diagnosed with left eye glaucoma with an intraocular pressure of 47 mmgHg    Her right eye interocular pressure was 17 mmHg  In the ED she was prescribed acetazolamide 500 mg BID, timolol eye drops, lantaprost eye drops and tobramycin-dexamethasone eye drops  Her symptoms improved with the medications  She was referred to an opthalmologist but could see them not because she does not have insurance  Today she is reporting some right eye pain when she touches under her eye for the past two days  She has associated right eye clear tearing  She does not have eye crusting in the morning  She has not experienced change in vision over the past two days  This does not feel similar to her left eye symptoms  She does get environmental allergies and has eye itching and tearing but this does not feel like that  Her great grandmother was blind, otherwise she denies family history of ophthalmologic disease  Interview with assistance of [employer]  number: [id number]  The following portions of the patient's history were reviewed and updated as appropriate: She  has a past medical history of Acid reflux, Heart palpitations, Urinary tract infection, and Varicella  She   Patient Active Problem List    Diagnosis Date Noted    Glaucoma of left eye 2021    Eye tearing, right 2021    Negative pregnancy test 2019    Encounter for initial prescription of contraceptive pills 2019    Amenorrhea 2019    Positive urine pregnancy test 2019    Encounter for gynecological examination with abnormal finding 2019    S/P  section 2017     She  has a past surgical history that includes Dilation and curettage of uterus and pr  delivery only (N/A, 2017)  Her family history includes No Known Problems in her father and mother  She  reports that she has never smoked  She has never used smokeless tobacco  She reports that she does not drink alcohol or use drugs    Current Outpatient Medications   Medication Sig Dispense Refill    acetaZOLAMIDE (DIAMOX) 500 mg capsule Take 1 capsule (500 mg total) by mouth 2 (two) times a day 60 capsule 0    Latanoprost 0 005 % EMUL Administer 1 drop into the left eye daily at bedtime 1 Bottle 1    timolol (TIMOPTIC) 0 5 % ophthalmic solution Administer 1 drop into the left eye 2 (two) times a day 15 mL 1    norethindrone (MICRONOR) 0 35 MG tablet Take 1 tablet (0 35 mg total) by mouth daily for 28 days 28 tablet 0    polyvinyl alcohol (LIQUIFILM TEARS) 1 4 % ophthalmic solution Administer 1 drop to both eyes as needed for dry eyes 15 mL 0    tobramycin-dexamethasone (TOBRADEX) ophthalmic suspension ADMINISTER 1 DROP INTO THE LEFT EYE 2 (TWO) TIMES A DAY       No current facility-administered medications for this visit  She has No Known Allergies       Review of Systems   Constitutional: Negative for chills and fever  HENT: Negative for congestion and sore throat  Eyes: Positive for pain (mild right eye)  Negative for photophobia  Respiratory: Negative for chest tightness and shortness of breath  Cardiovascular: Negative for chest pain and leg swelling  Gastrointestinal: Negative for abdominal pain, diarrhea, nausea and vomiting  Musculoskeletal: Negative for myalgias  Skin: Negative for color change  Allergic/Immunologic: Positive for environmental allergies  Neurological: Negative for weakness and headaches  Objective:      /80   Pulse 96   Temp (!) 96 2 °F (35 7 °C)   Resp 18   Ht 5' 4 8" (1 646 m)   Wt 71 9 kg (158 lb 9 6 oz)   SpO2 97%   BMI 26 56 kg/m²          Physical Exam  Vitals signs reviewed  Constitutional:       General: She is not in acute distress  Appearance: Normal appearance  HENT:      Head: Normocephalic and atraumatic  Right Ear: External ear normal       Left Ear: External ear normal    Eyes:      Extraocular Movements: Extraocular movements intact        Conjunctiva/sclera: Conjunctivae normal       Pupils: Pupils are equal, round, and reactive to light       Comments: Right eye tearing, clear discharge, very minimal discomfort when lacrimal duct palpated  Neck:      Musculoskeletal: Normal range of motion  Cardiovascular:      Rate and Rhythm: Normal rate and regular rhythm  Pulses: Normal pulses  Heart sounds: Normal heart sounds  Pulmonary:      Effort: Pulmonary effort is normal       Breath sounds: Normal breath sounds  Abdominal:      General: Bowel sounds are normal       Palpations: Abdomen is soft  Tenderness: There is no abdominal tenderness  Musculoskeletal: Normal range of motion  Right lower leg: No edema  Left lower leg: No edema  Skin:     General: Skin is warm and dry  Capillary Refill: Capillary refill takes less than 2 seconds  Neurological:      Mental Status: She is alert and oriented to person, place, and time  Mental status is at baseline     Psychiatric:         Mood and Affect: Mood normal          Behavior: Behavior normal

## 2021-01-29 NOTE — ASSESSMENT & PLAN NOTE
Right eye tearing x2 days, no other seasonal allergy symptoms  Clear discharge, minimal discomfort when palpating lacrimal duct, may be occluded  Start artificial tears  Educated on lacrimal duct massage  Follow up as needed if worsening or no improvement

## 2021-01-29 NOTE — TELEPHONE ENCOUNTER
ELSI Olivo have Natasha here at the office this morning she is looking to schedule an appt with Opthalmology and was looking for assitance setting this up

## 2021-02-08 ENCOUNTER — TELEPHONE (OUTPATIENT)
Dept: FAMILY MEDICINE CLINIC | Facility: CLINIC | Age: 41
End: 2021-02-08

## 2021-02-08 NOTE — TELEPHONE ENCOUNTER
Patient has a referral to Ophthalmology and is waiting for an appointment  Are you able to schedule that for her

## 2021-02-12 DIAGNOSIS — H40.9 GLAUCOMA OF LEFT EYE, UNSPECIFIED GLAUCOMA TYPE: ICD-10-CM

## 2021-02-15 RX ORDER — LATANOPROST 50 UG/ML
SOLUTION/ DROPS OPHTHALMIC
Qty: 7.5 ML | Refills: 1 | Status: SHIPPED | OUTPATIENT
Start: 2021-02-15

## 2021-02-15 RX ORDER — ACETAZOLAMIDE 500 MG/1
CAPSULE, EXTENDED RELEASE ORAL
Qty: 180 CAPSULE | Refills: 1 | Status: SHIPPED | OUTPATIENT
Start: 2021-02-15 | End: 2022-06-02

## 2021-02-25 NOTE — TELEPHONE ENCOUNTER
I SPOKE TO PATIENT, SHE HAS NO INSURANCE AT THIS MOMENT, I WILL LOOK INTO FINDING HER A SPECIALIST THAT CAN SEE HER FOR A LESSER EXAM COST TO HER

## 2021-03-07 ENCOUNTER — APPOINTMENT (EMERGENCY)
Dept: RADIOLOGY | Facility: HOSPITAL | Age: 41
End: 2021-03-07

## 2021-03-07 ENCOUNTER — HOSPITAL ENCOUNTER (EMERGENCY)
Facility: HOSPITAL | Age: 41
Discharge: HOME | End: 2021-03-07
Attending: EMERGENCY MEDICINE | Admitting: EMERGENCY MEDICINE

## 2021-03-07 VITALS
DIASTOLIC BLOOD PRESSURE: 98 MMHG | SYSTOLIC BLOOD PRESSURE: 144 MMHG | RESPIRATION RATE: 20 BRPM | HEART RATE: 107 BPM | WEIGHT: 150 LBS | TEMPERATURE: 98.5 F | OXYGEN SATURATION: 100 % | BODY MASS INDEX: 25.61 KG/M2 | HEIGHT: 64 IN

## 2021-03-07 DIAGNOSIS — S51.011A LACERATION OF RIGHT ELBOW, INITIAL ENCOUNTER: Primary | ICD-10-CM

## 2021-03-07 DIAGNOSIS — M25.521 ELBOW PAIN, RIGHT: ICD-10-CM

## 2021-03-07 PROCEDURE — 99283 EMERGENCY DEPT VISIT LOW MDM: CPT

## 2021-03-07 PROCEDURE — 73080 X-RAY EXAM OF ELBOW: CPT

## 2021-03-07 PROCEDURE — 99284 EMERGENCY DEPT VISIT MOD MDM: CPT | Performed by: EMERGENCY MEDICINE

## 2021-03-07 PROCEDURE — 12002 RPR S/N/AX/GEN/TRNK2.6-7.5CM: CPT | Performed by: EMERGENCY MEDICINE

## 2021-03-07 RX ORDER — ACETAMINOPHEN 325 MG/1
975 TABLET ORAL ONCE
Status: COMPLETED | OUTPATIENT
Start: 2021-03-07 | End: 2021-03-07

## 2021-03-07 RX ORDER — LIDOCAINE HYDROCHLORIDE AND EPINEPHRINE 10; 10 MG/ML; UG/ML
5 INJECTION, SOLUTION INFILTRATION; PERINEURAL ONCE
Status: DISCONTINUED | OUTPATIENT
Start: 2021-03-07 | End: 2021-03-07

## 2021-03-07 RX ORDER — LIDOCAINE HYDROCHLORIDE 10 MG/ML
5 INJECTION, SOLUTION EPIDURAL; INFILTRATION; INTRACAUDAL; PERINEURAL ONCE
Status: COMPLETED | OUTPATIENT
Start: 2021-03-07 | End: 2021-03-07

## 2021-03-07 RX ADMIN — LIDOCAINE HYDROCHLORIDE 5 ML: 10 INJECTION, SOLUTION EPIDURAL; INFILTRATION; INTRACAUDAL; PERINEURAL at 02:27

## 2021-03-07 RX ADMIN — ACETAMINOPHEN 975 MG: 325 TABLET, COATED ORAL at 02:28
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
## 2021-03-09 NOTE — TELEPHONE ENCOUNTER
Per CFS, A glaucoma evaluation will most likely run anywhere from $125 - $225  I sent her another email questioning if patient needed to pay that up front or can create a payment plan? Waiting for her response

## 2021-03-09 NOTE — TELEPHONE ENCOUNTER
I spoke to patient, she is ok with paying out of pocket at the time of visit, I will email Too Yanez the referral with office visit for her to schedule for Natasha  Ladies, Can you please send me the Ophthalmology Referral? Thank you so much!

## 2021-07-22 ENCOUNTER — APPOINTMENT (EMERGENCY)
Dept: CT IMAGING | Facility: HOSPITAL | Age: 41
End: 2021-07-22

## 2021-07-22 ENCOUNTER — HOSPITAL ENCOUNTER (EMERGENCY)
Facility: HOSPITAL | Age: 41
Discharge: HOME | End: 2021-07-22
Attending: EMERGENCY MEDICINE

## 2021-07-22 VITALS
OXYGEN SATURATION: 100 % | TEMPERATURE: 98.6 F | BODY MASS INDEX: 24.11 KG/M2 | RESPIRATION RATE: 16 BRPM | DIASTOLIC BLOOD PRESSURE: 92 MMHG | SYSTOLIC BLOOD PRESSURE: 130 MMHG | HEIGHT: 66 IN | WEIGHT: 150 LBS | HEART RATE: 80 BPM

## 2021-07-22 DIAGNOSIS — R03.0 TRANSIENT ELEVATED BLOOD PRESSURE: ICD-10-CM

## 2021-07-22 DIAGNOSIS — B96.89 SINUSITIS, BACTERIAL: Primary | ICD-10-CM

## 2021-07-22 DIAGNOSIS — J32.9 SINUSITIS, BACTERIAL: Primary | ICD-10-CM

## 2021-07-22 DIAGNOSIS — Z20.822 ENCOUNTER FOR LABORATORY TESTING FOR COVID-19 VIRUS: ICD-10-CM

## 2021-07-22 LAB
ALBUMIN SERPL BCP-MCNC: 3.8 G/DL (ref 3.4–4.8)
ALP SERPL-CCNC: 130.3 U/L (ref 35–140)
ALT SERPL W P-5'-P-CCNC: 19 U/L (ref 5–54)
ANION GAP SERPL CALCULATED.3IONS-SCNC: 6 MMOL/L (ref 4–13)
AST SERPL W P-5'-P-CCNC: 23 U/L (ref 15–41)
BASOPHILS # BLD AUTO: 0.02 THOUSANDS/ΜL (ref 0–0.1)
BASOPHILS NFR BLD AUTO: 0 % (ref 0–1)
BILIRUB SERPL-MCNC: 0.31 MG/DL (ref 0.3–1.2)
BUN SERPL-MCNC: 9 MG/DL (ref 6–20)
CALCIUM SERPL-MCNC: 9.8 MG/DL (ref 8.4–10.2)
CHLORIDE SERPL-SCNC: 101 MMOL/L (ref 96–108)
CO2 SERPL-SCNC: 28 MMOL/L (ref 22–33)
CREAT SERPL-MCNC: 0.6 MG/DL (ref 0.4–1.1)
EOSINOPHIL # BLD AUTO: 0.51 THOUSAND/ΜL (ref 0–0.61)
EOSINOPHIL NFR BLD AUTO: 8 % (ref 0–6)
ERYTHROCYTE [DISTWIDTH] IN BLOOD BY AUTOMATED COUNT: 12.7 % (ref 11.6–15.1)
EXT PREG TEST URINE: NEGATIVE
EXT. CONTROL ED NAV: NORMAL
GFR SERPL CREATININE-BSD FRML MDRD: 113 ML/MIN/1.73SQ M
GLUCOSE SERPL-MCNC: 96 MG/DL (ref 65–140)
HCT VFR BLD AUTO: 39.6 % (ref 34.8–46.1)
HGB BLD-MCNC: 12.6 G/DL (ref 11.5–15.4)
IMM GRANULOCYTES # BLD AUTO: 0.01 THOUSAND/UL (ref 0–0.2)
IMM GRANULOCYTES NFR BLD AUTO: 0 % (ref 0–2)
LYMPHOCYTES # BLD AUTO: 1.39 THOUSANDS/ΜL (ref 0.6–4.47)
LYMPHOCYTES NFR BLD AUTO: 22 % (ref 14–44)
MCH RBC QN AUTO: 28.3 PG (ref 26.8–34.3)
MCHC RBC AUTO-ENTMCNC: 31.8 G/DL (ref 31.4–37.4)
MCV RBC AUTO: 89 FL (ref 82–98)
MONOCYTES # BLD AUTO: 0.93 THOUSAND/ΜL (ref 0.17–1.22)
MONOCYTES NFR BLD AUTO: 15 % (ref 4–12)
NEUTROPHILS # BLD AUTO: 3.37 THOUSANDS/ΜL (ref 1.85–7.62)
NEUTS SEG NFR BLD AUTO: 55 % (ref 43–75)
PLATELET # BLD AUTO: 354 THOUSANDS/UL (ref 149–390)
PMV BLD AUTO: 9.5 FL (ref 8.9–12.7)
POTASSIUM SERPL-SCNC: 4.8 MMOL/L (ref 3.5–5)
PROT SERPL-MCNC: 8.1 G/DL (ref 6.4–8.3)
RBC # BLD AUTO: 4.45 MILLION/UL (ref 3.81–5.12)
SARS-COV-2 RNA RESP QL NAA+PROBE: NEGATIVE
SODIUM SERPL-SCNC: 135 MMOL/L (ref 133–145)
TROPONIN I SERPL-MCNC: <0.03 NG/ML (ref 0–0.07)
WBC # BLD AUTO: 6.23 THOUSAND/UL (ref 4.31–10.16)

## 2021-07-22 PROCEDURE — 36415 COLL VENOUS BLD VENIPUNCTURE: CPT | Performed by: EMERGENCY MEDICINE

## 2021-07-22 PROCEDURE — 96374 THER/PROPH/DIAG INJ IV PUSH: CPT

## 2021-07-22 PROCEDURE — 96361 HYDRATE IV INFUSION ADD-ON: CPT

## 2021-07-22 PROCEDURE — 84484 ASSAY OF TROPONIN QUANT: CPT | Performed by: EMERGENCY MEDICINE

## 2021-07-22 PROCEDURE — 70450 CT HEAD/BRAIN W/O DYE: CPT

## 2021-07-22 PROCEDURE — 99284 EMERGENCY DEPT VISIT MOD MDM: CPT

## 2021-07-22 PROCEDURE — 81025 URINE PREGNANCY TEST: CPT | Performed by: EMERGENCY MEDICINE

## 2021-07-22 PROCEDURE — 99284 EMERGENCY DEPT VISIT MOD MDM: CPT | Performed by: EMERGENCY MEDICINE

## 2021-07-22 PROCEDURE — 87635 SARS-COV-2 COVID-19 AMP PRB: CPT | Performed by: EMERGENCY MEDICINE

## 2021-07-22 PROCEDURE — 80053 COMPREHEN METABOLIC PANEL: CPT | Performed by: EMERGENCY MEDICINE

## 2021-07-22 PROCEDURE — 85025 COMPLETE CBC W/AUTO DIFF WBC: CPT | Performed by: EMERGENCY MEDICINE

## 2021-07-22 PROCEDURE — 96375 TX/PRO/DX INJ NEW DRUG ADDON: CPT

## 2021-07-22 RX ORDER — METOCLOPRAMIDE HYDROCHLORIDE 5 MG/ML
10 INJECTION INTRAMUSCULAR; INTRAVENOUS ONCE
Status: COMPLETED | OUTPATIENT
Start: 2021-07-22 | End: 2021-07-22

## 2021-07-22 RX ORDER — FLUTICASONE PROPIONATE 50 MCG
2 SPRAY, SUSPENSION (ML) NASAL DAILY
Qty: 16 G | Refills: 0 | Status: SHIPPED | OUTPATIENT
Start: 2021-07-22

## 2021-07-22 RX ORDER — DIPHENHYDRAMINE HYDROCHLORIDE 50 MG/ML
25 INJECTION INTRAMUSCULAR; INTRAVENOUS ONCE
Status: COMPLETED | OUTPATIENT
Start: 2021-07-22 | End: 2021-07-22

## 2021-07-22 RX ORDER — IBUPROFEN 600 MG/1
600 TABLET ORAL EVERY 6 HOURS PRN
Qty: 60 TABLET | Refills: 0 | Status: SHIPPED | OUTPATIENT
Start: 2021-07-22 | End: 2022-03-03

## 2021-07-22 RX ORDER — KETOROLAC TROMETHAMINE 30 MG/ML
15 INJECTION, SOLUTION INTRAMUSCULAR; INTRAVENOUS ONCE
Status: COMPLETED | OUTPATIENT
Start: 2021-07-22 | End: 2021-07-22

## 2021-07-22 RX ORDER — AMOXICILLIN AND CLAVULANATE POTASSIUM 875; 125 MG/1; MG/1
1 TABLET, FILM COATED ORAL ONCE
Status: COMPLETED | OUTPATIENT
Start: 2021-07-22 | End: 2021-07-22

## 2021-07-22 RX ORDER — AMOXICILLIN AND CLAVULANATE POTASSIUM 875; 125 MG/1; MG/1
1 TABLET, FILM COATED ORAL EVERY 12 HOURS
Qty: 14 TABLET | Refills: 0 | Status: SHIPPED | OUTPATIENT
Start: 2021-07-22 | End: 2021-07-29

## 2021-07-22 RX ADMIN — AMOXICILLIN AND CLAVULANATE POTASSIUM 1 TABLET: 875; 125 TABLET, FILM COATED ORAL at 23:01

## 2021-07-22 RX ADMIN — SODIUM CHLORIDE 1000 ML: 0.9 INJECTION, SOLUTION INTRAVENOUS at 19:53

## 2021-07-22 RX ADMIN — DIPHENHYDRAMINE HYDROCHLORIDE 25 MG: 50 INJECTION, SOLUTION INTRAMUSCULAR; INTRAVENOUS at 19:57

## 2021-07-22 RX ADMIN — KETOROLAC TROMETHAMINE 15 MG: 30 INJECTION, SOLUTION INTRAMUSCULAR at 19:57

## 2021-07-22 RX ADMIN — METOCLOPRAMIDE HYDROCHLORIDE 10 MG: 5 INJECTION INTRAMUSCULAR; INTRAVENOUS at 19:57

## 2021-07-23 NOTE — ED PROVIDER NOTES
History  Chief Complaint   Patient presents with    Headache     c/o headache and neck pain times 5 days,  took tylenol with no relieve      This is a 42year old female that ambulated to the ED accompanied by her     She has PMH of heart palpitations and acid reflex  She reports that she has had a frontal and occipital headache for the past 5 days - is present upon awakening and present all day - no increase on decrease in severity  Denies hx of headache such as this  Also reports myalgias and intermittent dry cough  Denies fever or chills  Denies focal neuro deficits    Does report nasal congestion   Neither her or her  has been vaccinated with the COVID vaccine    Unknown if she has positive COVID contacts    Has expressed an interest in obtaining information to set up COVID vaccine    Denies chest pain or shortness of breath          Prior to Admission Medications   Prescriptions Last Dose Informant Patient Reported?  Taking?   acetaZOLAMIDE (DIAMOX) 500 mg capsule Not Taking at Unknown time  No No   Sig: Take 1 capsule (500 mg total) by mouth 2 (two) times a day   Patient not taking: Reported on 7/22/2021   acetaZOLAMIDE (DIAMOX) 500 mg capsule Not Taking at Unknown time  No No   Sig: TOME MARY CAPSULA DOS VECES AL VIBHA   Patient not taking: Reported on 7/22/2021   latanoprost (XALATAN) 0 005 % ophthalmic solution Not Taking at Unknown time  No No   Sig: ADMINISTER 1 DROP INTO THE LEFT EYE DAILY AT BEDTIME   Patient not taking: Reported on 7/22/2021   norethindrone (MICRONOR) 0 35 MG tablet Not Taking at Unknown time  No No   Sig: Take 1 tablet (0 35 mg total) by mouth daily   Patient not taking: Reported on 7/22/2021   polyvinyl alcohol (LIQUIFILM TEARS) 1 4 % ophthalmic solution Not Taking at Unknown time  No No   Sig: Administer 1 drop to both eyes as needed for dry eyes   Patient not taking: Reported on 7/22/2021   timolol (TIMOPTIC) 0 5 % ophthalmic solution Not Taking at Unknown time  No No Sig: Administer 1 drop into the left eye 2 (two) times a day   Patient not taking: Reported on 2021   tobramycin-dexamethasone (TOBRADEX) ophthalmic suspension Not Taking at Unknown time  Yes No   Sig: ADMINISTER 1 DROP INTO THE LEFT EYE 2 (TWO) TIMES A DAY   Patient not taking: Reported on 2021   tobramycin-dexamethasone (TOBRADEX) ophthalmic suspension Not Taking at Unknown time  No No   Sig: Administer 1 drop into the left eye 2 (two) times a day   Patient not taking: Reported on 2021      Facility-Administered Medications: None       Past Medical History:   Diagnosis Date    Acid reflux     with pregnancy    Heart palpitations     with pregnancy    Urinary tract infection     with pregnancy    Varicella        Past Surgical History:   Procedure Laterality Date    DILATION AND CURETTAGE OF UTERUS      x2    UT  DELIVERY ONLY N/A 2017    Procedure:  SECTION (); Surgeon: Meli Friend DO;  Location: [facility];  Service: Obstetrics       Family History   Problem Relation Age of Onset    No Known Problems Mother     No Known Problems Father     No Known Problems Sister     No Known Problems Brother     No Known Problems Daughter     Eczema Son     No Known Problems Sister     No Known Problems Sister     No Known Problems Daughter     No Known Problems Daughter      I have reviewed and agree with the history as documented  E-Cigarette/Vaping    E-Cigarette Use Never User      E-Cigarette/Vaping Substances    Nicotine No     THC No     CBD No     Flavoring No     Other No     Unknown No      Social History     Tobacco Use    Smoking status: Never Smoker    Smokeless tobacco: Never Used   Vaping Use    Vaping Use: Never used   Substance Use Topics    Alcohol use:  Yes     Alcohol/week: 1 0 standard drinks     Types: 1 Cans of beer per week    Drug use: No       Review of Systems   Constitutional: Negative for activity change, appetite change, chills, diaphoresis, fatigue, fever and unexpected weight change  HENT: Positive for congestion  Negative for ear discharge, ear pain, mouth sores, sinus pressure, sinus pain, sneezing, sore throat, trouble swallowing and voice change  Eyes: Negative for photophobia, pain, discharge, redness, itching and visual disturbance  Respiratory: Negative for cough, chest tightness and shortness of breath  Cardiovascular: Negative for chest pain, palpitations and leg swelling  Gastrointestinal: Negative for abdominal pain, constipation, nausea and vomiting  Endocrine: Negative for cold intolerance, heat intolerance, polydipsia, polyphagia and polyuria  Genitourinary: Negative for decreased urine volume, difficulty urinating, dysuria, frequency, hematuria and urgency  Musculoskeletal: Positive for myalgias  Negative for arthralgias, back pain, gait problem, joint swelling, neck pain and neck stiffness  Skin: Negative for color change and rash  Allergic/Immunologic: Negative for immunocompromised state  Neurological: Positive for headaches  Negative for dizziness, tremors, seizures, syncope, speech difficulty, weakness, light-headedness and numbness  Hematological: Does not bruise/bleed easily  Psychiatric/Behavioral: Negative for behavioral problems and suicidal ideas  Physical Exam  Physical Exam  Vitals and nursing note reviewed  Constitutional:       General: She is not in acute distress  Appearance: Normal appearance  She is well-developed and normal weight  She is not ill-appearing, toxic-appearing or diaphoretic  HENT:      Head: Normocephalic and atraumatic  Right Ear: Ear canal and external ear normal  There is no impacted cerumen  Left Ear: Ear canal and external ear normal  There is no impacted cerumen  Ears:      Comments: Bilateral TMs bulging with opaque fluid behind the TMS - no injection     Nose: Nose normal  No congestion or rhinorrhea  Mouth/Throat:      Mouth: Mucous membranes are moist       Pharynx: Oropharynx is clear  No oropharyngeal exudate or posterior oropharyngeal erythema  Eyes:      General: No visual field deficit or scleral icterus  Right eye: No discharge  Left eye: No discharge  Extraocular Movements: Extraocular movements intact  Conjunctiva/sclera: Conjunctivae normal       Pupils: Pupils are equal, round, and reactive to light  Neck:      Thyroid: No thyromegaly  Vascular: No hepatojugular reflux or JVD  Trachea: No tracheal deviation  Cardiovascular:      Rate and Rhythm: Normal rate and regular rhythm  Pulses: Normal pulses  Carotid pulses are 2+ on the right side and 2+ on the left side  Radial pulses are 2+ on the right side and 2+ on the left side  Dorsalis pedis pulses are 2+ on the right side and 2+ on the left side  Posterior tibial pulses are 2+ on the right side and 2+ on the left side  Heart sounds: Normal heart sounds  No murmur heard  Pulmonary:      Effort: Pulmonary effort is normal  No accessory muscle usage or respiratory distress  Breath sounds: Normal breath sounds  No wheezing or rales  Chest:      Chest wall: No mass, deformity, tenderness, crepitus or edema  Abdominal:      General: Bowel sounds are normal  There is no distension or abdominal bruit  Palpations: Abdomen is soft  There is no hepatomegaly, splenomegaly or mass  Tenderness: There is no abdominal tenderness  There is no right CVA tenderness, left CVA tenderness, guarding or rebound  Hernia: No hernia is present  Musculoskeletal:         General: No tenderness  Normal range of motion  Cervical back: Normal range of motion and neck supple  No rigidity  No muscular tenderness  Right lower leg: No tenderness  No edema  Left lower leg: No tenderness  No edema  Lymphadenopathy:      Cervical: No cervical adenopathy     Skin: General: Skin is warm and dry  Capillary Refill: Capillary refill takes less than 2 seconds  Findings: No ecchymosis or rash  Neurological:      General: No focal deficit present  Mental Status: She is alert and oriented to person, place, and time  Mental status is at baseline  GCS: GCS eye subscore is 4  GCS verbal subscore is 5  GCS motor subscore is 6  Cranial Nerves: No cranial nerve deficit, dysarthria or facial asymmetry  Sensory: No sensory deficit  Motor: Motor function is intact  No weakness, tremor, abnormal muscle tone, seizure activity or pronator drift  Coordination: Coordination is intact  Romberg sign negative  Coordination normal  Finger-Nose-Finger Test and Heel to [facility] Test normal  Rapid alternating movements normal       Gait: Gait is intact  Gait normal       Deep Tendon Reflexes: Reflexes normal       Reflex Scores:       Tricep reflexes are 2+ on the right side and 2+ on the left side  Bicep reflexes are 2+ on the right side and 2+ on the left side  Brachioradialis reflexes are 2+ on the right side and 2+ on the left side  Patellar reflexes are 2+ on the right side and 2+ on the left side  Achilles reflexes are 2+ on the right side and 2+ on the left side  Psychiatric:         Mood and Affect: Mood normal          Behavior: Behavior normal          Thought Content:  Thought content normal          Judgment: Judgment normal          Vital Signs  ED Triage Vitals   Temperature Pulse Respirations Blood Pressure SpO2   07/22/21 1926 07/22/21 1929 07/22/21 1926 07/22/21 1926 07/22/21 1926   98 5 °F (36 9 °C) 96 16 (!) 143/102 98 %      Temp Source Heart Rate Source Patient Position - Orthostatic VS BP Location FiO2 (%)   07/22/21 2228 07/22/21 2050 07/22/21 1926 07/22/21 1926 --   Oral Monitor Lying Right arm       Pain Score       07/22/21 1926       5           Vitals:    07/22/21 1929 07/22/21 2050 07/22/21 2145 07/22/21 2228 BP:  136/95 157/92 130/92   Pulse: 96 82 82 80   Patient Position - Orthostatic VS:  Lying Lying Lying         Visual Acuity      ED Medications  Medications   sodium chloride 0 9 % bolus 1,000 mL (0 mL Intravenous Stopped 7/22/21 2111)   ketorolac (TORADOL) injection 15 mg (15 mg Intravenous Given 7/22/21 1957)   metoclopramide (REGLAN) injection 10 mg (10 mg Intravenous Given 7/22/21 1957)   diphenhydrAMINE (BENADRYL) injection 25 mg (25 mg Intravenous Given 7/22/21 1957)   amoxicillin-clavulanate (AUGMENTIN) 875-125 mg per tablet 1 tablet (1 tablet Oral Given 7/22/21 2301)       Diagnostic Studies  Results Reviewed     Procedure Component Value Units Date/Time    POCT pregnancy, urine [[id number]]  (Normal) Resulted: 07/22/21 2152    Lab Status: Final result Updated: 07/22/21 2153     EXT PREG TEST UR (Ref: Negative) negative     Control valid    CBC and differential [[id number]]  (Abnormal) Collected: 07/22/21 1952    Lab Status: Final result Specimen: Blood from Arm, Right Updated: 07/22/21 2117     WBC 6 23 Thousand/uL      RBC 4 45 Million/uL      Hemoglobin 12 6 g/dL      Hematocrit 39 6 %      MCV 89 fL      MCH 28 3 pg      MCHC 31 8 g/dL      RDW 12 7 %      MPV 9 5 fL      Platelets 116 Thousands/uL      Neutrophils Relative 55 %      Immat GRANS % 0 %      Lymphocytes Relative 22 %      Monocytes Relative 15 %      Eosinophils Relative 8 %      Basophils Relative 0 %      Neutrophils Absolute 3 37 Thousands/µL      Immature Grans Absolute 0 01 Thousand/uL      Lymphocytes Absolute 1 39 Thousands/µL      Monocytes Absolute 0 93 Thousand/µL      Eosinophils Absolute 0 51 Thousand/µL      Basophils Absolute 0 02 Thousands/µL     Narrative: This is an appended report  These results have been appended to a previously verified report      Novel Coronavirus (Covid-19),PCR [facility] - 2 Hour Stat [[id number]]  (Normal) Collected: 07/22/21 1952    Lab Status: Final result Specimen: Nares from Nasopharyngeal Swab Updated: 07/22/21 2109     SARS-CoV-2 Negative    Narrative: The specimen collection materials, transport medium, and/or testing methodology utilized in the production of these test results have been proven to be reliable in a limited validation with an abbreviated program under the Emergency Utilization Authorization provided by the FDA  Testing reported as "Presumptive positive" will be confirmed with secondary testing to ensure result accuracy  Clinical caution and judgement should be used with the interpretation of these results with consideration of the clinical impression and other laboratory testing  Testing reported as "Positive" or "Negative" has been proven to be accurate according to standard laboratory validation requirements  All testing is performed with control materials showing appropriate reactivity at standard intervals        Troponin I [[id number]]  (Normal) Collected: 07/22/21 1952    Lab Status: Final result Specimen: Blood from Arm, Right Updated: 07/22/21 2018     Troponin I <0 03 ng/mL     Comprehensive metabolic panel [[id number]] Collected: 07/22/21 1952    Lab Status: Final result Specimen: Blood from Arm, Right Updated: 07/22/21 2015     Sodium 135 mmol/L      Potassium 4 8 mmol/L      Chloride 101 mmol/L      CO2 28 mmol/L      ANION GAP 6 mmol/L      BUN 9 mg/dL      Creatinine 0 60 mg/dL      Glucose 96 mg/dL      Calcium 9 8 mg/dL      AST 23 U/L      ALT 19 U/L      Alkaline Phosphatase 130 3 U/L      Total Protein 8 1 g/dL      Albumin 3 8 g/dL      Total Bilirubin 0 31 mg/dL      eGFR 113 ml/min/1 73sq m     Narrative:      Jin guidelines for Chronic Kidney Disease (CKD):     Stage 1 with normal or high GFR (GFR > 90 mL/min/1 73 square meters)    Stage 2 Mild CKD (GFR = 60-89 mL/min/1 73 square meters)    Stage 3A Moderate CKD (GFR = 45-59 mL/min/1 73 square meters)    Stage 3B Moderate CKD (GFR = 30-44 mL/min/1 73 square meters)    Stage 4 Severe CKD (GFR = 15-29 mL/min/1 73 square meters)    Stage 5 End Stage CKD (GFR <15 mL/min/1 73 square meters)  Note: GFR calculation is accurate only with a steady state creatinine                 CT head without contrast   Final Result by Kenneth Velásquez MD (07/22 2225)      No evidence of acute intracranial hemorrhage  Pansinusitis  Workstation performed: [id number]                    Procedures  Procedures         ED Course  ED Course as of Jul 22 2322   Thu Jul 22, 2021 2225 Awaiting CT interpretation to dispo patient    Pt reports complete relief of headache      2318 Point of care pregnancy negative  EKG within normal limits  CMP is normal with normal electrolytes normal renal function with BUN of 9 creatinine 0 60  Normal liver enzymes  CBC unremarkable with no leukocytosis  White count 6 23  Hemoglobin hematocrit 12 6 and 39 6  Platelets stable 277  Without intervention patient's blood pressure did improve to 130/92  Patient remained afebrile  Heart rate was normal with normal respiratory rate  Patient had no triggers for sepsis  She continued oxygen % on room air  On physical exam her lungs were clear with a good bilateral symmetrical air movement  I did medicate patient with Toradol IV, Reglan IV and Benadryl IV push for her headache  She had complete relief of the headache  Hydrated with a L of normal saline  CT scan was interpreted by the radiologist is are being pansinusitis but no other acute findings  Based on patient's symptoms and being symptomatic for expanded period of time I did start patient on Augmentin orally  Also ordered her fluticasone and I did demonstrate her the proper technique for installation to receive the maximum amount of the medication and the sinuses  Also ordered ibuprofen  I did refer her to in [place]    Both her and her  going to call in [place] tomorrow to make appointment for PCP and the COVID vaccination  Discussed concerns and needs when to return to the ED  Patient was reexamined at this time and informed of laboratory and/or imaging results and was found to be stable for discharge  Return to emergency department criteria was reviewed with the patient who verbalized understanding and was agreeable to discharge and the treatment plan at this time  5 Portions of the record may have been created with voice recognition software  Occasional wrong word or "sound a like" substitutions may have occurred due to the inherent limitations of voice recognition software  Read the chart carefully and recognize, using context, where substitutions have occurred  MDM  Number of Diagnoses or Management Options  Encounter for laboratory testing for COVID-19 virus: new and requires workup  Sinusitis, bacterial: new and requires workup  Transient elevated blood pressure: new and requires workup  Diagnosis management comments: This is a 30-year-old female who is been otherwise healthy that presents emergency department with headache that has been persistent for 5 days  Headache is frontal and occipital   She denies any history of migraine headaches she denies any focal deficits  René coma Score is 15 the neurological exam was completely normal   Patient also reported myalgias  She has not been vaccinated for the COVID virus  Blood pressure is elevated 143/103 on arrival   Patient has no history of hypertension  Was concern for COVID, intracranial mass, intracranial hemorrhage as this is the worst headache in patient's life, acute sinusitis, FAB, electrolyte imbalance, dehydration, new onset hypertension, hypertensive urgency and other diagnoses  Also concerned that patient could have intracranial lesion based on the hypertension is new with a headache      Will get a CT scan of the headache is the worst headache patient has ever had shin with does not have headaches  Will get a CBC CMP, COVID, EKG and troponin  Amount and/or Complexity of Data Reviewed  Clinical lab tests: ordered and reviewed  Tests in the radiology section of CPT®: ordered and reviewed  Obtain history from someone other than the patient: yes ()  Independent visualization of images, tracings, or specimens: yes    Risk of Complications, Morbidity, and/or Mortality  Presenting problems: high  Diagnostic procedures: moderate  Management options: moderate    Patient Progress  Patient progress: improved      Disposition  Final diagnoses:   Sinusitis, bacterial   Transient elevated blood pressure   Encounter for laboratory testing for COVID-19 virus     Time reflects when diagnosis was documented in both MDM as applicable and the Disposition within this note     Time User Action Codes Description Comment    7/22/2021 11:00 PM Alan Stockton [J32 9,  B96 89] Sinusitis, bacterial     7/22/2021 11:00 PM Alan Stockton [R03 0] Transient elevated blood pressure     7/22/2021 11:00 PM Alan Stockton [C32 635] Encounter for laboratory testing for COVID-19 virus       ED Disposition     ED Disposition Condition Date/Time Comment    Discharge Stable Thu Jul 22, 2021 10:59 PM Natasha [facility] discharge to home/self care              Follow-up Information     Follow up With Specialties Details Why Contact Info    [employer]  Schedule an appointment as soon as possible for a visit in 3 days  [phone]            Discharge Medication List as of 7/22/2021 11:03 PM      START taking these medications    Details   amoxicillin-clavulanate (AUGMENTIN) 875-125 mg per tablet Take 1 tablet by mouth every 12 (twelve) hours for 7 days, Starting Thu 7/22/2021, Until Thu 7/29/2021, Normal      fluticasone (FLONASE) 50 mcg/act nasal spray 2 sprays into each nostril daily, Starting Thu 7/22/2021, Normal      ibuprofen (MOTRIN) 600 mg tablet Take 1 tablet (600 mg total) by mouth every 6 (six) hours as needed for mild pain or moderate pain, Starting Thu 7/22/2021, Normal         CONTINUE these medications which have NOT CHANGED    Details   !! acetaZOLAMIDE (DIAMOX) 500 mg capsule Take 1 capsule (500 mg total) by mouth 2 (two) times a day, Starting Sat 1/9/2021, Normal      !! acetaZOLAMIDE (DIAMOX) 500 mg capsule TOME MARY CAPSULA DOS VECES AL VIBHA, Normal      latanoprost (XALATAN) 0 005 % ophthalmic solution ADMINISTER 1 DROP INTO THE LEFT EYE DAILY AT BEDTIME, Normal      norethindrone (MICRONOR) 0 35 MG tablet Take 1 tablet (0 35 mg total) by mouth daily, Starting Fri 1/29/2021, Until Sat 1/29/2022, Normal      polyvinyl alcohol (LIQUIFILM TEARS) 1 4 % ophthalmic solution Administer 1 drop to both eyes as needed for dry eyes, Starting Fri 1/29/2021, Normal      timolol (TIMOPTIC) 0 5 % ophthalmic solution Administer 1 drop into the left eye 2 (two) times a day, Starting Fri 1/29/2021, Normal      !! tobramycin-dexamethasone (TOBRADEX) ophthalmic suspension ADMINISTER 1 DROP INTO THE LEFT EYE 2 (TWO) TIMES A DAY, Historical Med      !! tobramycin-dexamethasone (TOBRADEX) ophthalmic suspension Administer 1 drop into the left eye 2 (two) times a day, Starting Sat 1/9/2021, Normal       !! - Potential duplicate medications found  Please discuss with provider  No discharge procedures on file      PDMP Review       Value Time User    PDMP Reviewed  Yes 7/22/2021 11:00 PM Pro Thomas MD          ED Provider  Electronically Signed by           Pro Thomas MD  07/22/21 4404

## 2021-07-23 NOTE — ED NOTES
Spoke with pt and at-length regarding bp and prescribe meds, also provider spoke with pt and  also   Both understood instructions      Maria Guadalupe Galicia RN  07/22/21 3017

## 2021-07-26 ENCOUNTER — OFFICE VISIT (OUTPATIENT)
Dept: FAMILY MEDICINE CLINIC | Facility: CLINIC | Age: 41
End: 2021-07-26

## 2021-07-26 VITALS
WEIGHT: 156.8 LBS | TEMPERATURE: 97.4 F | DIASTOLIC BLOOD PRESSURE: 80 MMHG | HEIGHT: 66 IN | HEART RATE: 78 BPM | SYSTOLIC BLOOD PRESSURE: 134 MMHG | BODY MASS INDEX: 25.2 KG/M2

## 2021-07-26 DIAGNOSIS — R03.0 ELEVATED BLOOD PRESSURE READING: Primary | ICD-10-CM

## 2021-07-26 PROCEDURE — 99213 OFFICE O/P EST LOW 20 MIN: CPT | Performed by: FAMILY MEDICINE

## 2021-07-26 NOTE — PROGRESS NOTES
Assessment/Plan:    Elevated blood pressure reading  BP elevated in ED where she was evaluated and treated for sinus infection  BP normal today  Discussed no need for medication at this time and reviewed recommendations for healthy diet and exercise  Follow up as needed  Diagnoses and all orders for this visit:    Elevated blood pressure reading          Subjective:      Patient ID: Stefano Jackson is a 42 y o  female  She presents today for ED follow up  She was seen in the ED on 7/22 and diagnosed with pansinusitis and started on Augmentin  Her respiratory symptoms are now resolved and she reports being told to follow up regarding elevated BP reading  Today her BP is within normal range and it appears per chart review the elevated reading is an outlier  She has no further acute concerns today  The following portions of the patient's history were reviewed and updated as appropriate: allergies, current medications, past family history, past medical history, past social history, past surgical history and problem list     Review of Systems   Constitutional: Negative for activity change, chills, fatigue and fever  HENT: Negative for congestion, sinus pressure, sinus pain and sneezing  Psychiatric/Behavioral: Negative for sleep disturbance  All other systems reviewed and are negative  Objective:      /80 (BP Location: Left arm, Patient Position: Sitting, Cuff Size: Large)   Pulse 78   Temp (!) 97 4 °F (36 3 °C) (Temporal)   Ht 5' 6" (1 676 m)   Wt 71 1 kg (156 lb 12 8 oz)   BMI 25 31 kg/m²          Physical Exam  Vitals reviewed  Constitutional:       Appearance: Normal appearance  HENT:      Nose: Nose normal    Eyes:      Extraocular Movements: Extraocular movements intact  Conjunctiva/sclera: Conjunctivae normal    Cardiovascular:      Rate and Rhythm: Normal rate  Pulmonary:      Effort: Pulmonary effort is normal    Abdominal:      General: Abdomen is flat  Musculoskeletal:         General: Normal range of motion  Cervical back: Normal range of motion  Skin:     General: Skin is warm and dry  Neurological:      Mental Status: She is alert and oriented to person, place, and time     Psychiatric:         Mood and Affect: Mood normal          Behavior: Behavior normal

## 2021-07-27 NOTE — ASSESSMENT & PLAN NOTE
BP elevated in ED where she was evaluated and treated for sinus infection  BP normal today  Discussed no need for medication at this time and reviewed recommendations for healthy diet and exercise  Follow up as needed

## 2022-02-14 ENCOUNTER — OFFICE VISIT (OUTPATIENT)
Dept: FAMILY MEDICINE CLINIC | Facility: CLINIC | Age: 42
End: 2022-02-14

## 2022-02-14 VITALS
HEIGHT: 66 IN | HEART RATE: 90 BPM | TEMPERATURE: 97.9 F | RESPIRATION RATE: 18 BRPM | WEIGHT: 150.8 LBS | SYSTOLIC BLOOD PRESSURE: 148 MMHG | BODY MASS INDEX: 24.23 KG/M2 | OXYGEN SATURATION: 99 % | DIASTOLIC BLOOD PRESSURE: 100 MMHG

## 2022-02-14 DIAGNOSIS — L70.0 ACNE VULGARIS: ICD-10-CM

## 2022-02-14 DIAGNOSIS — I83.813 VARICOSE VEINS OF BOTH LOWER EXTREMITIES WITH PAIN: ICD-10-CM

## 2022-02-14 DIAGNOSIS — R03.0 ELEVATED BLOOD PRESSURE READING: Primary | ICD-10-CM

## 2022-02-14 PROCEDURE — 99213 OFFICE O/P EST LOW 20 MIN: CPT | Performed by: FAMILY MEDICINE

## 2022-02-14 RX ORDER — CLINDAMYCIN AND BENZOYL PEROXIDE 10; 50 MG/G; MG/G
GEL TOPICAL 2 TIMES DAILY
Qty: 25 G | Refills: 0 | Status: SHIPPED | OUTPATIENT
Start: 2022-02-14

## 2022-02-14 NOTE — ASSESSMENT & PLAN NOTE
Localized to the bilateral the nasal folds, inflammatory with minimal purulence  Trial of triple antibiotic cream provided intermittent relief  Will start Benzaclin BID and follow up in 2-4 weeks  Advised to use daily if she develops irritation

## 2022-02-14 NOTE — ASSESSMENT & PLAN NOTE
BP elevated today, has intermittent high BP readings  Asked to monitor at home for 2 weeks and bring log to next visit

## 2022-02-14 NOTE — ASSESSMENT & PLAN NOTE
Present for months, painful burning sensation  She has been using Vicks VapoRub without relief  Recommend Vascular surgery consult for possible intervention, compression stockings, elevate legs above heart after activity  Follow up as needed

## 2022-02-14 NOTE — PROGRESS NOTES
Assessment/Plan:    Acne vulgaris  Localized to the bilateral the nasal folds, inflammatory with minimal purulence  Trial of triple antibiotic cream provided intermittent relief  Will start Benzaclin BID and follow up in 2-4 weeks  Advised to use daily if she develops irritation    Elevated blood pressure reading  BP elevated today, has intermittent high BP readings  Asked to monitor at home for 2 weeks and bring log to next visit  Varicose veins of both lower extremities with pain  Present for months, painful burning sensation  She has been using Vicks VapoRub without relief  Recommend Vascular surgery consult for possible intervention, compression stockings, elevate legs above heart after activity  Follow up as needed         Diagnoses and all orders for this visit:    Elevated blood pressure reading  -     Blood Pressure Monitoring (Comfort Touch BP Cuff/Large) MISC; Use 1 each 1 (one) time for 1 dose    Acne vulgaris  -     clindamycin-benzoyl peroxide (BENZACLIN) gel; Apply topically 2 (two) times a day    Varicose veins of both lower extremities with pain  -     Ambulatory Referral to Vascular Surgery; Future  -     Compression Stocking          Subjective:      Patient ID: Dianne Penn is a 42 y o  female  Patient presents for evaluation of rash on sides of nose, x3-4 months, OTC Triple antibiotic helps, but not completely  She denies rash/acne anywhere else on the body  She also reports painful/pruritic enlarged veins on the medial lower extremities, worsening in the past year, but presents for about 3 years  She has been using VapoRub with some relief  The following portions of the patient's history were reviewed and updated as appropriate: allergies, current medications, past family history, past medical history, past social history, past surgical history and problem list     Review of Systems   Constitutional: Negative for activity change, appetite change, fatigue and fever  HENT: Negative for congestion  Respiratory: Negative for cough and shortness of breath  Neurological: Negative for headaches  Objective:      /100 (BP Location: Left arm, Patient Position: Sitting, Cuff Size: Standard)   Pulse 90   Temp 97 9 °F (36 6 °C) (Temporal)   Resp 18   Ht 5' 6" (1 676 m)   Wt 68 4 kg (150 lb 12 8 oz)   SpO2 99%   BMI 24 34 kg/m²          Physical Exam  Vitals reviewed  Constitutional:       Appearance: Normal appearance  She is normal weight  HENT:      Head: Normocephalic and atraumatic  Nose:      Comments: Erythematous papules bilaterally along nasal folds  Cardiovascular:      Rate and Rhythm: Normal rate and regular rhythm  Heart sounds: Normal heart sounds  No murmur heard  No friction rub  No gallop  Pulmonary:      Effort: Pulmonary effort is normal       Breath sounds: Normal breath sounds  No wheezing, rhonchi or rales  Abdominal:      General: Abdomen is flat  Bowel sounds are normal  There is no distension  Palpations: Abdomen is soft  Tenderness: There is no abdominal tenderness  Musculoskeletal:      Cervical back: Normal range of motion and neck supple  Comments: Varicosities b/l on medial thighs from just lower leg to the inguinal area  Large swellings present in inguinal area bilaterally  Skin:     General: Skin is warm and dry  Neurological:      Mental Status: She is alert

## 2022-03-02 NOTE — PROGRESS NOTES
Assessment/Plan:    Varicose veins of both lower extremities with pain  42 y o  female non-smoker w/ no significant PMH presents today to the vascular office w/ c/o symptomatic varicosities in the BLE  Recommendations   Symptomatic truncal varicosities of the BLE   Recommend 3 month trial of conservative measures to include daily use of compression stockings (rx provided), lower extremity elevation, low-sodium diet, aerobic activity, weight management and skin moisturization   LEVDR in 3 months   Return to office with surgeon in 3 months with LEVDR for re-evaluation and discussion of surgical options   Instructed to contact the office in the interim with any questions, concerns or new symptoms       Diagnoses and all orders for this visit:    Varicose veins of both lower extremities with pain  -     Ambulatory Referral to Vascular Surgery  -     VAS reflux lower limb venous duplex study with reflux assessment, complete bilateral; Future  -     Compression Stocking    Other orders  -     Ascorbic Acid (Vitamin C) 500 MG CAPS; Take by mouth  -     Iron-Vitamin C (Iron 100/C) 100-250 MG TABS; Take by mouth          Subjective:      Patient ID: Thomas Gamez is a 42 y o  female  HPI:     42 y o  female non-smoker w/ no significant PMH presents today to the vascular office w/ c/o symptomatic varicosities in the BLE  Patient is new to our practice and was referred by Jeannette Franklin DO  Patient c/o pain, bulging veins and itching  She reports that her BLE will feel tired and achy after standing or walking for prolonged periods  Symptoms have been present for approximately 4 years  Patient denies any wounds, discoloration or numbness  Patient denies any claudication symptoms  Patient does not wear compression stockings  Review of Systems   Constitutional: Negative  HENT: Negative  Eyes: Negative  Respiratory: Negative  Cardiovascular: Negative      Gastrointestinal: Negative  Endocrine: Negative  Genitourinary: Negative  Musculoskeletal: Negative  Skin: Negative  Allergic/Immunologic: Negative  Neurological: Negative  Hematological: Negative  Psychiatric/Behavioral: Negative  The following portions of the patient's history were reviewed and updated as appropriate: allergies, current medications, past family history, past medical history, past social history, past surgical history, and problem list     I have reviewed and made appropriate changes to the review of systems input by the medical assistant  Vitals:    22 1406   BP: 120/96   BP Location: Left arm   Patient Position: Sitting   Cuff Size: Adult   Pulse: 97   Weight: 63 2 kg (139 lb 6 4 oz)   Height: 5' 6" (1 676 m)       Patient Active Problem List   Diagnosis    S/P  section    Positive urine pregnancy test    Encounter for gynecological examination with abnormal finding    Negative pregnancy test    Encounter for laboratory testing for COVID-19 virus    Amenorrhea    Glaucoma of left eye    Eye tearing, right    Sinusitis, bacterial    Elevated blood pressure reading    Acne vulgaris    Varicose veins of both lower extremities with pain       Past Surgical History:   Procedure Laterality Date    DILATION AND CURETTAGE OF UTERUS      x2    AK  DELIVERY ONLY N/A 2017    Procedure:  SECTION ();   Surgeon: Sylvia Brown DO;  [facility];  Service: Obstetrics       Family History   Problem Relation Age of Onset    No Known Problems Mother     No Known Problems Father     No Known Problems Sister     No Known Problems Brother     No Known Problems Daughter     Eczema Son     No Known Problems Sister     No Known Problems Sister     No Known Problems Daughter     No Known Problems Daughter        Social History     Socioeconomic History    Marital status: /Civil Union     Spouse name: Not on file    Number of children: 4    Years of education: Not on file    Highest education level: Not on file   Occupational History    Not on file   Tobacco Use    Smoking status: Never Smoker    Smokeless tobacco: Never Used   Vaping Use    Vaping Use: Never used   Substance and Sexual Activity    Alcohol use:  Yes     Alcohol/week: 1 0 standard drink     Types: 1 Cans of beer per week    Drug use: No    Sexual activity: Yes     Partners: Male     Birth control/protection: None   Other Topics Concern    Not on file   Social History Narrative    ** Merged History Encounter **          Social Determinants of Health     Financial Resource Strain: Not on file   Food Insecurity: Not on file   Transportation Needs: Not on file   Physical Activity: Not on file   Stress: Not on file   Social Connections: Not on file   Intimate Partner Violence: Not on file   Housing Stability: Not on file       Allergies   Allergen Reactions    Pollen Extract Allergic Rhinitis         Current Outpatient Medications:     acetaZOLAMIDE (DIAMOX) 500 mg capsule, Take 1 capsule (500 mg total) by mouth 2 (two) times a day, Disp: 20 capsule, Rfl: 0    acetaZOLAMIDE (DIAMOX) 500 mg capsule, TOME MARY CAPSULA DOS VECES AL VIBHA, Disp: 180 capsule, Rfl: 1    Ascorbic Acid (Vitamin C) 500 MG CAPS, Take by mouth, Disp: , Rfl:     clindamycin-benzoyl peroxide (BENZACLIN) gel, Apply topically 2 (two) times a day, Disp: 25 g, Rfl: 0    fluticasone (FLONASE) 50 mcg/act nasal spray, 2 sprays into each nostril daily, Disp: 16 g, Rfl: 0    Iron-Vitamin C (Iron 100/C) 100-250 MG TABS, Take by mouth, Disp: , Rfl:     latanoprost (XALATAN) 0 005 % ophthalmic solution, ADMINISTER 1 DROP INTO THE LEFT EYE DAILY AT BEDTIME, Disp: 7 5 mL, Rfl: 1    polyvinyl alcohol (LIQUIFILM TEARS) 1 4 % ophthalmic solution, Administer 1 drop to both eyes as needed for dry eyes, Disp: 15 mL, Rfl: 0    timolol (TIMOPTIC) 0 5 % ophthalmic solution, Administer 1 drop into the left eye 2 (two) times a day, Disp: 15 mL, Rfl: 1    tobramycin-dexamethasone (TOBRADEX) ophthalmic suspension, ADMINISTER 1 DROP INTO THE LEFT EYE 2 (TWO) TIMES A DAY, Disp: , Rfl:     tobramycin-dexamethasone (TOBRADEX) ophthalmic suspension, Administer 1 drop into the left eye 2 (two) times a day, Disp: 5 mL, Rfl: 0    norethindrone (MICRONOR) 0 35 MG tablet, Take 1 tablet (0 35 mg total) by mouth daily (Patient not taking: Reported on 7/22/2021), Disp: 28 tablet, Rfl: 11      Objective:    /96 (BP Location: Left arm, Patient Position: Sitting, Cuff Size: Adult)   Pulse 97   Ht 5' 6" (1 676 m)   Wt 63 2 kg (139 lb 6 4 oz)   BMI 22 50 kg/m²        Physical Exam  HENT:      Head: Normocephalic  Eyes:      Extraocular Movements: Extraocular movements intact  Cardiovascular:      Rate and Rhythm: Normal rate and regular rhythm  Pulses:           Popliteal pulses are 2+ on the right side and 2+ on the left side  Dorsalis pedis pulses are 2+ on the right side and 2+ on the left side  Posterior tibial pulses are 2+ on the right side and 2+ on the left side  Heart sounds: Normal heart sounds  No murmur heard  No friction rub  No gallop  Pulmonary:      Effort: Pulmonary effort is normal       Breath sounds: Normal breath sounds  No wheezing, rhonchi or rales  Musculoskeletal:         General: Normal range of motion  Right lower leg: No edema  Left lower leg: No edema  Skin:     General: Skin is warm and dry  Capillary Refill: Capillary refill takes less than 2 seconds  Findings: No lesion, rash or wound  Comments: Truncal varicosities noted to patient's BLE  Pictures obtained in office today  Neurological:      General: No focal deficit present  Mental Status: She is alert and oriented to person, place, and time  Cranial Nerves: Cranial nerves are intact  Motor: Motor function is intact  Gait: Gait is intact     Psychiatric: Mood and Affect: Mood normal          Behavior: Behavior normal  Behavior is cooperative           [address], [address]   Vascular Surgery  3/3/2022 2:31 PM

## 2022-03-03 ENCOUNTER — OFFICE VISIT (OUTPATIENT)
Dept: VASCULAR SURGERY | Facility: CLINIC | Age: 42
End: 2022-03-03

## 2022-03-03 VITALS
SYSTOLIC BLOOD PRESSURE: 120 MMHG | HEART RATE: 97 BPM | HEIGHT: 66 IN | WEIGHT: 139.4 LBS | DIASTOLIC BLOOD PRESSURE: 96 MMHG | BODY MASS INDEX: 22.4 KG/M2

## 2022-03-03 DIAGNOSIS — I83.813 VARICOSE VEINS OF BOTH LOWER EXTREMITIES WITH PAIN: Primary | ICD-10-CM

## 2022-03-03 PROCEDURE — 99243 OFF/OP CNSLTJ NEW/EST LOW 30: CPT | Performed by: NURSE PRACTITIONER

## 2022-03-03 RX ORDER — MULTIVIT-MIN/IRON/FOLIC ACID/K 18-600-40
CAPSULE ORAL
COMMUNITY

## 2022-03-03 RX ORDER — IRON,CARBONYL/ASCORBIC ACID 100-250 MG
TABLET ORAL
COMMUNITY

## 2022-03-03 NOTE — PATIENT INSTRUCTIONS
Várices   CUIDADO AMBULATORIO:   Várices son Jannetta Jackman que se engrandecen, tuercen e hinchan  Las venas varicosas comúnmente aparecen en la parte trasera de colette pantorrillas, rodillas y muslos  Las várices se [place] [address] no funcionan adecuadamente  [place] causa que la gloria se acumule y aumente la presión en las venas de colette piernas  El aumento de presión causa que colette venas se estiren, engrandezcan, hinchen y se tuerzan  Los síntomas más comunes [place] siguientes: Colette síntomas pueden empeorar después de tamica estado de pie o sentado por largos periodos de [place]  Puede presentar cualquiera de los siguientes signos o síntomas:  · Venas azules, moradas o sobresalientes en colette piernas    · Dolor, hinchazón o calambres musculares en colette piernas    · Sentir fatiga o pesadez en las piernas    · [employer] en las piernas    [place] atención médica de inmediato si:  · Usted tiene kennedy herida que no xenia o está infectada  · Usted tiene kennedy lesión que agrietó beck piel y provocó que las venas varicosas sangraran  · Beck pierna está inflamada y dura  · Usted nota que colette piernas o pies se están poniendo azulados o ennegrecidos  · Beck pierna se siente cálida, sensible y [place]  Se podría ramesh inflamado y ray  Comuníquese con beck médico si:  · Usted tiene dolor en beck pierna que no desaparece o que empeora  · Nota grandes moretones repentinos en las piernas  · Usted tiene sarpullido en beck pierna  · Colette síntomas le impiden realizar colette actividades diarias  · Usted tiene preguntas o inquietudes acerca de beck condición o cuidado  El tratamiento para las venas varicosas tiene loreta propósito disminuir síntomas, mejorar la apariencia y prevenir más problemas  El tratamiento dependerá de cuáles venas son afectadas y la severidad de beck condición en cada kennedy de colette venas afectadas  Usted puede necesitar procedimientos para tratar o eliminar las venas varicosas   Es probable que beck médico le inyecte kennedy solución o use un láser para cerrar las venas varicosas  También se puede hacer kennedy cirugía para quitar venas largas  [place] a beck médico más información acerca de los procedimientos utilizados para tratar venas varicosas  [address] várices:  · No se siente o esté de pie por largos periodos de tiempo  [place] puede causar que la gloria se acumule en julia piernas y que julia síntomas empeoren  [place] o gire julia tobillos varias veces cada hora  [place] por varios minutos cada hora para que la gloria en julia piernas se [place]  · No cruce julia piernas cuando se siente  [place] disminuye el flujo sanguíneo a julia pies y [employer]  · No use ropa o zapatos ajustados  No use zapatos de tacones altos  No use ropa que [place] alrededor de la cintura o las rodillas  · Mantenga un peso saludable  Beck sobrepeso u obesidad puede empeorar tus varices  Consulte con beck médico cuánto debería pesar  [place] que lo ayude a crear un plan para bajar de peso si tiene sobrepeso  · Use medias de compresión loreta se le indique  Las medias son ajustadas y ejercen presión a julia piernas  Mejoran el flujo sanguíneo y [facility] a prevenir coágulos sanguíneos  · Eleve julia piernas  Manténgalas a un nivel por encima de beck corazón por 15 a 30 minutos varias veces al día  También puede apuntalar el extremo de beck cama ligeramente para elevar las piernas mientras duerme  [place] ayuda a que la gloria fluya de regreso hacia beck corazón  · Ejercítese regularmente  Consulte con beck médico acerca de cuál es el mejor régimen de ejercicio para usted  El ejercicio puede mejorar el flujo sanguíneo a las piernas y los pies  Acuda a la consulta de control con beck médico según las indicaciones: Anote julia preguntas para que se acuerde de hacerlas aida julia visitas    © Copyright [employer] [facility] 2022 Information is for End User's use only and may not be sold, redistributed or otherwise used for commercial purposes  All illustrations and images included in CareNotes® are the copyrighted property of A D A M , Inc  or [address] [place] [address] es sólo para uso en educación  Beck intención no es darle un consejo médico sobre enfermedades o tratamientos  Colsulte con beck [place] farmacéutico antes de seguir cualquier régimen médico para saber si es seguro y efectivo para usted

## 2022-03-03 NOTE — ASSESSMENT & PLAN NOTE
42 y o  female non-smoker w/ no significant PMH presents today to the vascular office w/ c/o symptomatic varicosities in the BLE  Recommendations   Symptomatic truncal varicosities of the BLE   Recommend 3 month trial of conservative measures to include daily use of compression stockings (rx provided), lower extremity elevation, low-sodium diet, aerobic activity, weight management and skin moisturization       LEVDR in 3 months   Return to office with surgeon in 3 months with LEVDR for re-evaluation and discussion of surgical options   Instructed to contact the office in the interim with any questions, concerns or new symptoms

## 2022-05-08 ENCOUNTER — APPOINTMENT (EMERGENCY)
Dept: CT IMAGING | Facility: HOSPITAL | Age: 42
End: 2022-05-08

## 2022-05-08 ENCOUNTER — HOSPITAL ENCOUNTER (EMERGENCY)
Facility: HOSPITAL | Age: 42
Discharge: HOME | End: 2022-05-08
Attending: EMERGENCY MEDICINE

## 2022-05-08 VITALS
BODY MASS INDEX: 24.39 KG/M2 | HEART RATE: 99 BPM | OXYGEN SATURATION: 100 % | HEIGHT: 64 IN | WEIGHT: 142.86 LBS | RESPIRATION RATE: 18 BRPM | SYSTOLIC BLOOD PRESSURE: 146 MMHG | TEMPERATURE: 98.1 F | DIASTOLIC BLOOD PRESSURE: 96 MMHG

## 2022-05-08 DIAGNOSIS — R42 DIZZINESS: Primary | ICD-10-CM

## 2022-05-08 DIAGNOSIS — R91.8 PULMONARY NODULES: ICD-10-CM
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
## 2022-05-08 DIAGNOSIS — R59.0 CERVICAL LYMPHADENOPATHY: ICD-10-CM

## 2022-05-08 DIAGNOSIS — R94.31 T WAVE INVERSION IN EKG: ICD-10-CM

## 2022-05-08 DIAGNOSIS — R59.0 THORACIC LYMPHADENOPATHY: ICD-10-CM

## 2022-05-08 LAB
ANION GAP SERPL CALCULATED.3IONS-SCNC: 9 MMOL/L (ref 4–13)
BASOPHILS # BLD AUTO: 0.02 THOUSANDS/ΜL (ref 0–0.1)
BASOPHILS NFR BLD AUTO: 0 % (ref 0–1)
BUN SERPL-MCNC: 7 MG/DL (ref 5–25)
CALCIUM SERPL-MCNC: 8.9 MG/DL (ref 8.4–10.2)
CHLORIDE SERPL-SCNC: 105 MMOL/L (ref 96–108)
CO2 SERPL-SCNC: 23 MMOL/L (ref 21–32)
CREAT SERPL-MCNC: 0.61 MG/DL (ref 0.6–1.3)
EOSINOPHIL # BLD AUTO: 0.25 THOUSAND/ΜL (ref 0–0.61)
EOSINOPHIL NFR BLD AUTO: 4 % (ref 0–6)
ERYTHROCYTE [DISTWIDTH] IN BLOOD BY AUTOMATED COUNT: 15 % (ref 11.6–15.1)
EXT PREG TEST URINE: NEGATIVE
EXT. CONTROL ED NAV: NORMAL
GFR SERPL CREATININE-BSD FRML MDRD: 112 ML/MIN/1.73SQ M
GLUCOSE SERPL-MCNC: 73 MG/DL (ref 65–140)
HCT VFR BLD AUTO: 36.6 % (ref 34.8–46.1)
HGB BLD-MCNC: 11.8 G/DL (ref 11.5–15.4)
IMM GRANULOCYTES # BLD AUTO: 0.01 THOUSAND/UL (ref 0–0.2)
IMM GRANULOCYTES NFR BLD AUTO: 0 % (ref 0–2)
LYMPHOCYTES # BLD AUTO: 0.92 THOUSANDS/ΜL (ref 0.6–4.47)
LYMPHOCYTES NFR BLD AUTO: 15 % (ref 14–44)
MCH RBC QN AUTO: 26.5 PG (ref 26.8–34.3)
MCHC RBC AUTO-ENTMCNC: 32.2 G/DL (ref 31.4–37.4)
MCV RBC AUTO: 82 FL (ref 82–98)
MONOCYTES # BLD AUTO: 0.58 THOUSAND/ΜL (ref 0.17–1.22)
MONOCYTES NFR BLD AUTO: 9 % (ref 4–12)
NEUTROPHILS # BLD AUTO: 4.52 THOUSANDS/ΜL (ref 1.85–7.62)
NEUTS SEG NFR BLD AUTO: 72 % (ref 43–75)
NRBC BLD AUTO-RTO: 0 /100 WBCS
PLATELET # BLD AUTO: 359 THOUSANDS/UL (ref 149–390)
PMV BLD AUTO: 8.8 FL (ref 8.9–12.7)
POTASSIUM SERPL-SCNC: 3.6 MMOL/L (ref 3.5–5.3)
RBC # BLD AUTO: 4.46 MILLION/UL (ref 3.81–5.12)
SODIUM SERPL-SCNC: 137 MMOL/L (ref 135–147)
WBC # BLD AUTO: 6.3 THOUSAND/UL (ref 4.31–10.16)

## 2022-05-08 PROCEDURE — 80048 BASIC METABOLIC PNL TOTAL CA: CPT | Performed by: EMERGENCY MEDICINE

## 2022-05-08 PROCEDURE — 72125 CT NECK SPINE W/O DYE: CPT

## 2022-05-08 PROCEDURE — 70450 CT HEAD/BRAIN W/O DYE: CPT

## 2022-05-08 PROCEDURE — 12013 RPR F/E/E/N/L/M 2.6-5.0 CM: CPT | Performed by: EMERGENCY MEDICINE

## 2022-05-08 PROCEDURE — 99285 EMERGENCY DEPT VISIT HI MDM: CPT | Performed by: EMERGENCY MEDICINE

## 2022-05-08 PROCEDURE — 36415 COLL VENOUS BLD VENIPUNCTURE: CPT | Performed by: EMERGENCY MEDICINE

## 2022-05-08 PROCEDURE — 99284 EMERGENCY DEPT VISIT MOD MDM: CPT

## 2022-05-08 PROCEDURE — 71250 CT THORAX DX C-: CPT

## 2022-05-08 PROCEDURE — 85025 COMPLETE CBC W/AUTO DIFF WBC: CPT | Performed by: EMERGENCY MEDICINE

## 2022-05-08 PROCEDURE — 93005 ELECTROCARDIOGRAM TRACING: CPT

## 2022-05-08 PROCEDURE — 81025 URINE PREGNANCY TEST: CPT | Performed by: EMERGENCY MEDICINE

## 2022-05-08 RX ORDER — ROPIVACAINE HYDROCHLORIDE 5 MG/ML
10 INJECTION, SOLUTION EPIDURAL; INFILTRATION; PERINEURAL ONCE
Status: COMPLETED | OUTPATIENT
Start: 2022-05-08 | End: 2022-05-08

## 2022-05-08 RX ADMIN — ROPIVACAINE HYDROCHLORIDE 10 ML: 5 INJECTION, SOLUTION EPIDURAL; INFILTRATION; PERINEURAL at 13:22

## 2022-05-08 NOTE — ED PROVIDER NOTES
History  Chief Complaint   Patient presents with    Facial Injury     trip and fall forward yesterday, struck face on tile floor, no LOC, pain upper lip and loose front teeth     42ear-old female, primarily Stateless speaking and choosing to have her children be her  for her history  Patient has a past medical history of glaucoma, palpitations, UTI, GI reflux  Patient presents for laceration to the upper lip, right front incisor pain, fall  Patient reports that she was bending down to  a child late last night  Patient felt dizzy and fell forward striking her head on the porcelain tile floor  Patient suffered a laceration of the upper lip  Denies loss of consciousness, chest pain or shortness of breath prior to fall, change in vision, hearing, numbness, tingling, weakness, inability to ambulate after this episode  Patient reports that she is currently on a diet and believes that it might have contributed to her episode of dizziness  Not currently dizzy  Admits to drinking some wine last night  Fall  Injury location:  Head/neck  Head/neck injury location:  Head  Incident location: home    Arrived directly from scene: no    Suspicion of alcohol use: yes    Suspicion of drug use: no    Tetanus status:  Up to date  Prior to arrival data:     Bystander interventions:  None    Patient ambulatory at scene: yes      Blood loss:  None    Responsiveness at scene:  Alert    Orientation at scene:  Person, place, situation and time    Loss of consciousness: no      Amnesic to event: no      Airway interventions:  None    Breathing interventions:  None    IV access status:  None    IO access:  None    Fluids administered:  None    Cardiac interventions:  None    Medications administered:  None    Immobilization:  None    Airway condition since incident:  Stable    Breathing condition since incident:  Stable    Circulation condition since incident:  Stable    Mental status condition since incident:  Stable      Prior to Admission Medications   Prescriptions Last Dose Informant Patient Reported? Taking? Ascorbic Acid (Vitamin C) 500 MG CAPS   Yes No   Sig: Take by mouth   Iron-Vitamin C (Iron 100/C) 100-250 MG TABS   Yes No   Sig: Take by mouth   acetaZOLAMIDE (DIAMOX) 500 mg capsule  Self No No   Sig: Take 1 capsule (500 mg total) by mouth 2 (two) times a day   acetaZOLAMIDE (DIAMOX) 500 mg capsule  Self No No   Sig: TOME MARY CAPSULA DOS VECES AL VIBHA   clindamycin-benzoyl peroxide (BENZACLIN) gel   No No   Sig: Apply topically 2 (two) times a day   fluticasone (FLONASE) 50 mcg/act nasal spray  Self No No   Si sprays into each nostril daily   latanoprost (XALATAN) 0 005 % ophthalmic solution  Self No No   Sig: ADMINISTER 1 DROP INTO THE LEFT EYE DAILY AT BEDTIME   norethindrone (MICRONOR) 0 35 MG tablet  Self No No   Sig: Take 1 tablet (0 35 mg total) by mouth daily   Patient not taking: Reported on 2021   polyvinyl alcohol (LIQUIFILM TEARS) 1 4 % ophthalmic solution  Self No No   Sig: Administer 1 drop to both eyes as needed for dry eyes   timolol (TIMOPTIC) 0 5 % ophthalmic solution  Self No No   Sig: Administer 1 drop into the left eye 2 (two) times a day   tobramycin-dexamethasone (TOBRADEX) ophthalmic suspension  Self Yes No   Sig: ADMINISTER 1 DROP INTO THE LEFT EYE 2 (TWO) TIMES A DAY   tobramycin-dexamethasone (TOBRADEX) ophthalmic suspension  Self No No   Sig: Administer 1 drop into the left eye 2 (two) times a day      Facility-Administered Medications: None       Past Medical History:   Diagnosis Date    Acid reflux     with pregnancy    Heart palpitations     with pregnancy    Urinary tract infection     with pregnancy    Varicella        Past Surgical History:   Procedure Laterality Date    DILATION AND CURETTAGE OF UTERUS      x2    SC  DELIVERY ONLY N/A 2017    Procedure:  SECTION ();   Surgeon: Valeriy Voss DO;  Location: [facility] ; Service: Obstetrics       Family History   Problem Relation Age of Onset    No Known Problems Mother     No Known Problems Father     No Known Problems Sister     No Known Problems Brother     No Known Problems Daughter     Eczema Son     No Known Problems Sister     No Known Problems Sister     No Known Problems Daughter     No Known Problems Daughter      I have reviewed and agree with the history as documented  E-Cigarette/Vaping    E-Cigarette Use Never User      E-Cigarette/Vaping Substances    Nicotine No     THC No     CBD No     Flavoring No     Other No     Unknown No      Social History     Tobacco Use    Smoking status: Never Smoker    Smokeless tobacco: Never Used   Vaping Use    Vaping Use: Never used   Substance Use Topics    Alcohol use: Yes     Alcohol/week: 1 0 standard drink     Types: 1 Cans of beer per week    Drug use: No       Review of Systems   HENT:        Tooth pain     Skin: Positive for wound  Neurological: Positive for dizziness  Fall     All other systems reviewed and are negative  Physical Exam  Physical Exam  Vitals and nursing note reviewed  Constitutional:       General: She is not in acute distress  Appearance: Normal appearance  She is not ill-appearing  HENT:      Head: Normocephalic and atraumatic  Right Ear: External ear normal       Left Ear: External ear normal       Nose: Nose normal       Mouth/Throat:      Mouth: Mucous membranes are moist       Comments: Right front incisor not impacted  pain on palpation with slight wiggling  No pain to other dentition  Upper lip with approximately 1 cm defect on the outside of the left and 3 cm defect on the inside of the lip  Inside defect is in the shape of a T  wound is gaping  Eyes:      General:         Right eye: No discharge  Left eye: No discharge        Conjunctiva/sclera: Conjunctivae normal    Cardiovascular:      Rate and Rhythm: Normal rate and regular rhythm  Pulses: Normal pulses  Heart sounds: No murmur heard  Pulmonary:      Effort: Pulmonary effort is normal       Breath sounds: Normal breath sounds  Abdominal:      General: Abdomen is flat  There is no distension  Tenderness: There is no abdominal tenderness  Musculoskeletal:         General: Normal range of motion  Cervical back: Normal range of motion  Skin:     General: Skin is warm  Capillary Refill: Capillary refill takes less than 2 seconds  Findings: No rash  Neurological:      General: No focal deficit present  Mental Status: She is alert  Mental status is at baseline     Psychiatric:         Mood and Affect: Mood normal          Behavior: Behavior normal          Vital Signs  ED Triage Vitals [05/08/22 1238]   Temperature Pulse Respirations Blood Pressure SpO2   98 1 °F (36 7 °C) 99 18 146/96 100 %      Temp Source Heart Rate Source Patient Position - Orthostatic VS BP Location FiO2 (%)   Oral Monitor Sitting Left arm --      Pain Score       9           Vitals:    05/08/22 1238   BP: 146/96   Pulse: 99   Patient Position - Orthostatic VS: Sitting         Visual Acuity      ED Medications  Medications   ropivacaine (NAROPIN) 0 5 % injection 10 mL (10 mL Infiltration Given 5/8/22 1322)       Diagnostic Studies  Results Reviewed     Procedure Component Value Units Date/Time    POCT pregnancy, urine [[id number]]  (Normal) Resulted: 05/08/22 1426    Lab Status: Final result Updated: 05/08/22 1426     EXT PREG TEST UR (Ref: Negative) negative     Control valid    Basic metabolic panel [[id number]] Collected: 05/08/22 1322    Lab Status: Final result Specimen: Blood from Arm, Left Updated: 05/08/22 1346     Sodium 137 mmol/L      Potassium 3 6 mmol/L      Chloride 105 mmol/L      CO2 23 mmol/L      ANION GAP 9 mmol/L      BUN 7 mg/dL      Creatinine 0 61 mg/dL      Glucose 73 mg/dL      Calcium 8 9 mg/dL      eGFR 112 ml/min/1 73sq m     Narrative: National Kidney Disease Foundation guidelines for Chronic Kidney Disease (CKD):     Stage 1 with normal or high GFR (GFR > 90 mL/min/1 73 square meters)    Stage 2 Mild CKD (GFR = 60-89 mL/min/1 73 square meters)    Stage 3A Moderate CKD (GFR = 45-59 mL/min/1 73 square meters)    Stage 3B Moderate CKD (GFR = 30-44 mL/min/1 73 square meters)    Stage 4 Severe CKD (GFR = 15-29 mL/min/1 73 square meters)    Stage 5 End Stage CKD (GFR <15 mL/min/1 73 square meters)  Note: GFR calculation is accurate only with a steady state creatinine    CBC and differential [[id number]]  (Abnormal) Collected: 05/08/22 1322    Lab Status: Final result Specimen: Blood from Arm, Left Updated: 05/08/22 1328     WBC 6 30 Thousand/uL      RBC 4 46 Million/uL      Hemoglobin 11 8 g/dL      Hematocrit 36 6 %      MCV 82 fL      MCH 26 5 pg      MCHC 32 2 g/dL      RDW 15 0 %      MPV 8 8 fL      Platelets 766 Thousands/uL      nRBC 0 /100 WBCs      Neutrophils Relative 72 %      Immat GRANS % 0 %      Lymphocytes Relative 15 %      Monocytes Relative 9 %      Eosinophils Relative 4 %      Basophils Relative 0 %      Neutrophils Absolute 4 52 Thousands/µL      Immature Grans Absolute 0 01 Thousand/uL      Lymphocytes Absolute 0 92 Thousands/µL      Monocytes Absolute 0 58 Thousand/µL      Eosinophils Absolute 0 25 Thousand/µL      Basophils Absolute 0 02 Thousands/µL                  CT chest without contrast   Final Result by Lyman Crigler, MD (05/08 6608)      Multiple enlarged mediastinal, hilar, right axillary, and epicardial lymph nodes  Some of the mediastinal and hilar lymph nodes are calcified, which, along with the lung nodularity, is most suspicious for sarcoidosis  Lymphoma is less likely  Follow-up with pulmonology is recommended for clinical/bronchoscopic confirmation  The study was marked in [facility] for immediate notification           Workstation performed: [id number]         CT head without contrast   Final Result by Sinan Landrum MD (05/08 2054)      No acute intracranial abnormality  Mucosal thickening in both maxillary sinuses, with air-fluid levels, which could represent acute sinusitis  Workstation performed: [id number]         CT spine cervical without contrast   Final Result by Daphne Comer MD (05/08 5285)         1  No acute cervical spine fracture or traumatic malalignment  2   Intraparotid lesions likely to represent lymph nodes, cervical, supraclavicular  and partially visualized mediastinal lymphadenopathy  Numerous pulmonary nodules in the visualized upper lungs measuring up to 8 mm  Findings concerning for malignancy    versus inflammatory or infectious process  Recommend chest CT  Workstation performed: [id number]                    Procedures  Laceration repair    Date/Time: 5/8/2022 1:19 PM  Performed by: Panda Whaley DO  Authorized by: Panda Whaley DO   Consent: The procedure was performed in an emergent situation  Verbal consent obtained    Consent given by: patient  Patient understanding: patient states understanding of the procedure being performed  Patient identity confirmed: verbally with patient  Body area: head/neck  Location details: upper lip  Full thickness lip laceration: yes  Vermilion border involved: no  Lip laceration height: up to half vertical height  Laceration length: 3 cm  Tendon involvement: none  Nerve involvement: none  Vascular damage: no  Anesthesia: nerve block (bilateral infra-orbital)    Anesthesia:  Anesthetic total: 3 mL    Sedation:  Patient sedated: no      Wound Dehiscence:  Superficial Wound Dehiscence: simple closure      Procedure Details:  Irrigation solution: tap water  Irrigation method: tap  Amount of cleaning: standard  Debridement: none  Degree of undermining: none  Mucous membrane closure: 5-0 Chromic gut  Number of sutures: 4  Technique: simple  Approximation: close  Approximation difficulty: simple               ED Course  ED Course as of 05/08/22 1511   Sun May 08, 2022   1324 Procedure Note: EKG  Date/Time: 05/08/22 1:27 PM   Interpreted by: Thee Cueto  Indications / Diagnosis: dizziness  ECG reviewed by me, the ED Provider: yes   The EKG demonstrates:  Rhythm: sinus tachycardia 108  Intervals: normal intervals  Axis: normal axis  QRS/Blocks: normal QRS  ST Changes: No acute ST Changes, no STD/KHADIJAH  T-wave inversion in lead 3, V2, V3                                                MDM  Number of Diagnoses or Management Options  Cervical lymphadenopathy: new and requires workup  Dizziness: new and requires workup  Fall: new and requires workup  Pulmonary nodules: new and requires workup  T wave inversion in EKG: new and requires workup  Thoracic lymphadenopathy: new and requires workup  Diagnosis management comments: Results of imaging, ECG discussed with patient twice  Both times using iPad   69-year-old female presents after having a ground level fall  Felt dizzy prior to the ground level fall  Admits to alcohol use before hand  No neurological deficits on examination  Not consistent with stroke  No chest pain or shortness of breath prior to fall  Not a syncopal episode  Patient has a large laceration through and through her upper lip  Not through the vermilion border  Infraorbital blocks performed with good effect  Laceration closed with 2 stitches to the Internal 2 stitches to the external lip  Five 0 gut used  Patient told to watch for signs of infection come back to the emergency department for those  Tetanus is up-to-date per review of records  Will evaluate for arrhythmia, anemia, electrolyte abnormalities  ECG with 3 T-wave inversions  Otherwise no signs of arrhythmia  No significant anemia or electrolyte abnormalities  Patient also has central cervical spinal tenderness  Will evaluate for intracranial bleed/cervical spine fracture    Imaging without acute findings, but incidental findings of lung nodules, lymphadenopathy concerning for malignancy  Discussed with patient the concern for malignancy  Will evaluate with CT of the chest without contrast due to current IV contrast shortage to evaluate for cancer of the lungs or thoracic cavity  Patient has lung nodules and lymphadenopathy  Calcification of nodules  Discussed differential that includes sarcoidosis, cancer with patient  Patient is medically stable for discharge  Recommended follow-up with pulmonology as soon as possible  Strict return precautions given       Amount and/or Complexity of Data Reviewed  Clinical lab tests: ordered and reviewed  Tests in the radiology section of CPT®: ordered and reviewed  Review and summarize past medical records: yes  Independent visualization of images, tracings, or specimens: yes    Risk of Complications, Morbidity, and/or Mortality  Presenting problems: moderate  Diagnostic procedures: moderate  Management options: moderate        Disposition  Final diagnoses:   Dizziness   T wave inversion in EKG   Pulmonary nodules   Thoracic lymphadenopathy   Cervical lymphadenopathy   Fall     Time reflects when diagnosis was documented in both MDM as applicable and the Disposition within this note     Time User Action Codes Description Comment    5/8/2022  1:30 PM Juli Minor Add [R42] Dizziness     5/8/2022  1:30 PM Juli Minor Add [R94 31] T wave inversion in EKG     5/8/2022  2:42 PM Juli Minor Add [R91 8] Pulmonary nodules     5/8/2022  2:42 PM Juli Minor Add [R59 0] Thoracic lymphadenopathy     5/8/2022  2:43 PM Juli Minor Add [R59 0] Cervical lymphadenopathy     5/8/2022  3:09 PM Juli Minor Add Melampus Grills  XXXA] Fall       ED Disposition     ED Disposition Condition Date/Time Comment    Discharge Stable Sun May 8, 2022  2:46 PM Abraham Montemayor discharge to home/self care              Follow-up Information     Follow up With Specialties Details Why Contact Info Additional [facility] Schedule an appointment as soon as possible for a visit  For re-evaluation as soon as possible [address] [facility] [address], [address] [address], [place], [place], [address] [place] [address] Emergency Department Emergency Medicine  If symptoms worsen [address] [facility],[address] [phone]  [address] [facility] Emergency Department, [address], [address] [place] [address] Drive Dentistry Schedule an appointment as soon as possible for a visit  for loose tooth [phone]  [address] [place] [address], [address] [place] [address], [phone], [phone]          Discharge Medication List as of 5/8/2022  2:49 PM      CONTINUE these medications which have NOT CHANGED    Details   !! acetaZOLAMIDE (DIAMOX) 500 mg capsule Take 1 capsule (500 mg total) by mouth 2 (two) times a day, Starting Sat 1/9/2021, Normal      !! acetaZOLAMIDE (DIAMOX) 500 mg capsule TOME MARY CAPSULA DOS VECES AL VIBHA, Normal      Ascorbic Acid (Vitamin C) 500 MG CAPS Take by mouth, Historical Med      clindamycin-benzoyl peroxide (BENZACLIN) gel Apply topically 2 (two) times a day, Starting Mon 2/14/2022, Normal      fluticasone (FLONASE) 50 mcg/act nasal spray 2 sprays into each nostril daily, Starting Thu 7/22/2021, Normal      Iron-Vitamin C (Iron 100/C) 100-250 MG TABS Take by mouth, Historical Med      latanoprost (XALATAN) 0 005 % ophthalmic solution ADMINISTER 1 DROP INTO THE LEFT EYE DAILY AT BEDTIME, Normal      norethindrone (MICRONOR) 0 35 MG tablet Take 1 tablet (0 35 mg total) by mouth daily, Starting Fri 1/29/2021, Until Sat 1/29/2022, Normal      polyvinyl alcohol (LIQUIFILM TEARS) 1 4 % ophthalmic solution Administer 1 drop to both eyes as needed for dry eyes, Starting Fri 1/29/2021, Normal      timolol (TIMOPTIC) 0 5 % ophthalmic solution Administer 1 drop into the left eye 2 (two) times a day, Starting Fri 1/29/2021, Normal      !! tobramycin-dexamethasone (TOBRADEX) ophthalmic suspension ADMINISTER 1 DROP INTO THE LEFT EYE 2 (TWO) TIMES A DAY, Historical Med      !! tobramycin-dexamethasone (TOBRADEX) ophthalmic suspension Administer 1 drop into the left eye 2 (two) times a day, Starting Sat 1/9/2021, Normal       !! - Potential duplicate medications found  Please discuss with provider  No discharge procedures on file      PDMP Review       Value Time User    PDMP Reviewed  Yes 7/22/2021 11:00 PM Inocente Chan MD          ED Provider  Electronically Signed by           Bobby Marshall DO  05/08/22 7290

## 2022-05-08 NOTE — DISCHARGE INSTRUCTIONS
CT scan of your head and cervical spine were normal     CT scan of your chest showed multiple enlarged mediastinal, hilar, right axillary, and epicardial lymph nodes  Some of the mediastinal and hilar lymph nodes are calcified, which, along with the lung nodularity, is most suspicious for sarcoidosis  Lymphoma is less likely  Follow-up with pulmonology is recommended for clinical/bronchoscopic confirmation  Call pulmonology right away for follow-up regarding these findings  Come back to the emergency department via new or worsening symptoms  The stitches in your lip will disintegrate over the next 4 weeks  EKG showed some nonspecific findings  Follow-up with your primary care doctor regarding these findings  Follow-up with dentistry regarding your loose tooth  Compact emergency department if signs of infection to her left hip including pus discharge, fevers, spreading redness

## 2022-05-10 LAB
ATRIAL RATE: 109 BPM
ATRIAL RATE: 109 BPM
P AXIS: 66 DEGREES
P AXIS: 66 DEGREES
PR INTERVAL: 131 MS
PR INTERVAL: 131 MS
QRS AXIS: 33 DEGREES
QRS AXIS: 33 DEGREES
QRSD INTERVAL: 89 MS
QRSD INTERVAL: 89 MS
QT INTERVAL: 338 MS
QT INTERVAL: 338 MS
QTC INTERVAL: 453 MS
QTC INTERVAL: 453 MS
T WAVE AXIS: 24 DEGREES
T WAVE AXIS: 24 DEGREES
VENTRICULAR RATE: 108 BPM
VENTRICULAR RATE: 108 BPM

## 2022-05-10 PROCEDURE — 93010 ELECTROCARDIOGRAM REPORT: CPT | Performed by: INTERNAL MEDICINE

## 2022-06-02 ENCOUNTER — OFFICE VISIT (OUTPATIENT)
Dept: PULMONOLOGY | Facility: CLINIC | Age: 42
End: 2022-06-02

## 2022-06-02 VITALS
HEART RATE: 94 BPM | WEIGHT: 143 LBS | DIASTOLIC BLOOD PRESSURE: 74 MMHG | OXYGEN SATURATION: 98 % | HEIGHT: 64 IN | SYSTOLIC BLOOD PRESSURE: 124 MMHG | RESPIRATION RATE: 16 BRPM | BODY MASS INDEX: 24.41 KG/M2

## 2022-06-02 DIAGNOSIS — R59.0 MEDIASTINAL LYMPHADENOPATHY: Primary | ICD-10-CM

## 2022-06-02 DIAGNOSIS — R91.8 MULTIPLE PULMONARY NODULES: ICD-10-CM

## 2022-06-02 PROCEDURE — 99244 OFF/OP CNSLTJ NEW/EST MOD 40: CPT | Performed by: INTERNAL MEDICINE

## 2022-06-02 NOTE — Clinical Note
What do you think about EBUS TBNA of mediastinal nodes? Other consideration would be IR for right axillary LN biopsy vs IR for percutaenous biopsy of a peripheral nodule  All of these are feasible

## 2022-06-02 NOTE — ASSESSMENT & PLAN NOTE
Incidental finding of mediastinal and right axillary lymphadenopathy  Some of the lymph nodes are calcified  Patient does not have any current B-symptoms such as night sweats or unintentional weight loss  Differential includes lymphoma, malignancy, sarcoidosis    - will pursue EBUS TBNA biopsy for diagnosis

## 2022-06-02 NOTE — ASSESSMENT & PLAN NOTE
Patient has multiple bilateral pulmonary nodules, more so on the right side than the left  Differential includes infection although less likely since she has no infectious symptoms  Also includes malignancy versus sarcoidosis  To consider percutaneous biopsy of right lower lobe nodule  However, we will ask IP pumonologist to evaluate for lymph node biopsy  - suspected diagnosis of sarcoidosis  This would be stage II disease  The patient is asymptomatic however given stage of disease, would consider treatment with steroids  Will await for biopsy results before making treatment plan

## 2022-06-02 NOTE — PROGRESS NOTES
Pulmonary Consultation   Xavier Kamara 36 y o  female MRN: [id number]  6/2/2022      Referring provider: Dr Renny Nolasco  Reason for consult: lung nodules    Assessment and Plan:  Mediastinal lymphadenopathy  Incidental finding of mediastinal and right axillary lymphadenopathy  Some of the lymph nodes are calcified  Patient does not have any current B-symptoms such as night sweats or unintentional weight loss  Differential includes lymphoma, malignancy, sarcoidosis  - will pursue EBUS TBNA biopsy for diagnosis    Multiple pulmonary nodules  Patient has multiple bilateral pulmonary nodules, more so on the right side than the left  Differential includes infection although less likely since she has no infectious symptoms  Also includes malignancy versus sarcoidosis  To consider percutaneous biopsy of right lower lobe nodule  However, we will ask IP pumonologist to evaluate for lymph node biopsy  - suspected diagnosis of sarcoidosis  This would be stage II disease  The patient is asymptomatic however given stage of disease, would consider treatment with steroids  Will await for biopsy results before making treatment plan  Diagnoses and all orders for this visit:    Mediastinal lymphadenopathy    Multiple pulmonary nodules    Plan of care discussed in detail with the patient and her spouse using   All questions and concerns addressed  Written information regarding sarcoidosis provided the patient although I stressed to her repeatedly that she does not yet have a diagnosis and we need to wait for biopsy result  Malignancy is certainly of concern in must be ruled out  She will return for follow-up in 4-6 weeks or sooner if needed  We will call her with biopsy results  History of Present Illness   HPI:  Xavier Kamara is a 36 y o  female who presents for evaluation of lung nodules  Pt was in the hospital 5/8/22 with trauma to her mouth   She had a CT cervical spine that showed concern for mediastinal lymphadenopathy  This prompted a CT chest and she was referred here  She denies SOB  Has an occasional cough with sputum  Denies night sweats  Denies unintentional weight loss  She is here with her spouse   Thompson Klinefelter #[id number] used for entire visit    Review of Systems  Positive as above and negative otherwise     Historical Information   Past Medical History:   Diagnosis Date    Acid reflux     with pregnancy    Heart palpitations     with pregnancy    Urinary tract infection     with pregnancy    Varicella      Past Surgical History:   Procedure Laterality Date    DILATION AND CURETTAGE OF UTERUS      x2    [place]  DELIVERY ONLY N/A 2017    Procedure:  SECTION ();   Surgeon: Kosta Bertrand DO;  Location: [facility];  Service: Obstetrics     Family History   Problem Relation Age of Onset    No Known Problems Mother     No Known Problems Father     No Known Problems Sister     No Known Problems Brother     No Known Problems Daughter     Eczema Son     No Known Problems Sister     No Known Problems Sister     No Known Problems Daughter     No Known Problems Daughter      No family history of lung disease; mother smokes    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    Social History: Never smoker; smokes hookah once monthly socially  Pets: none  Secondhand smoke exposure:    Meds/Allergies     Current Outpatient Medications:     acetaZOLAMIDE (DIAMOX) 500 mg capsule, Take 1 capsule (500 mg total) by mouth 2 (two) times a day, Disp: 20 capsule, Rfl: 0    acetaZOLAMIDE (DIAMOX) 500 mg capsule, TOME MARY CAPSULA DOS VECES AL VIBHA, Disp: 180 capsule, Rfl: 1    Ascorbic Acid (Vitamin C) 500 MG CAPS, Take by mouth, Disp: , Rfl:     clindamycin-benzoyl peroxide (BENZACLIN) gel, Apply topically 2 (two) times a day, Disp: 25 g, Rfl: 0    fluticasone (FLONASE) 50 mcg/act nasal spray, 2 sprays into each nostril daily, Disp: 16 g, Rfl: 0    Iron-Vitamin C 100-250 MG TABS, Take by mouth, Disp: , Rfl:     latanoprost (XALATAN) 0 005 % ophthalmic solution, ADMINISTER 1 DROP INTO THE LEFT EYE DAILY AT BEDTIME, Disp: 7 5 mL, Rfl: 1    polyvinyl alcohol (LIQUIFILM TEARS) 1 4 % ophthalmic solution, Administer 1 drop to both eyes as needed for dry eyes, Disp: 15 mL, Rfl: 0    timolol (TIMOPTIC) 0 5 % ophthalmic solution, Administer 1 drop into the left eye 2 (two) times a day, Disp: 15 mL, Rfl: 1    tobramycin-dexamethasone (TOBRADEX) ophthalmic suspension, ADMINISTER 1 DROP INTO THE LEFT EYE 2 (TWO) TIMES A DAY, Disp: , Rfl:     tobramycin-dexamethasone (TOBRADEX) ophthalmic suspension, Administer 1 drop into the left eye 2 (two) times a day, Disp: 5 mL, Rfl: 0    norethindrone (MICRONOR) 0 35 MG tablet, Take 1 tablet (0 35 mg total) by mouth daily (Patient not taking: Reported on 7/22/2021), Disp: 28 tablet, Rfl: 11  Allergies   Allergen Reactions    Pollen Extract Allergic Rhinitis       Vitals: Blood pressure 124/74, pulse 94, resp  rate 16, height 5' 4" (1 626 m), weight 64 9 kg (143 lb), SpO2 98 %, currently breastfeeding , Body mass index is 24 55 kg/m²  Oxygen Therapy  SpO2: 98 %  Oxygen Therapy: None (Room air)    Physical Exam  GEN: WDWN, nad, comfortable  HEENT: NCAT, EOMI  CVS: Regular, no m/r/g  LUNGS: CTA b/l  ABD: soft  EXT: No c/c/e  NEURO: No focal deficits  MS: Moving all extremities  SKIN: warm, dry  PSYCH: calm, cooperative      Labs: I have personally reviewed pertinent lab results    Lab Results   Component Value Date    WBC 6 30 05/08/2022    HGB 11 8 05/08/2022    HCT 36 6 05/08/2022    MCV 82 05/08/2022     05/08/2022     Lab Results   Component Value Date    CALCIUM 8 9 05/08/2022    K 3 6 05/08/2022    CO2 23 05/08/2022     05/08/2022    BUN 7 05/08/2022    CREATININE 0 61 05/08/2022     No results found for: IGE  Lab Results   Component Value Date    ALT 19 07/22/2021    AST 23 07/22/2021    ALKPHOS 130 3 07/22/2021       Labs per my interpretation show normal renal function; normal hemoglobin    Imaging and other studies: I have personally reviewed pertinent films in PACS  CT chest per my review shows extensive centrilobular nodularity upper lobes R>L with multiple nodules    [facility]

## 2022-06-03 ENCOUNTER — TELEPHONE (OUTPATIENT)
Dept: PULMONOLOGY | Facility: CLINIC | Age: 42
End: 2022-06-03

## 2022-06-03 ENCOUNTER — PREP FOR PROCEDURE (OUTPATIENT)
Dept: PULMONOLOGY | Facility: CLINIC | Age: 42
End: 2022-06-03

## 2022-06-03 DIAGNOSIS — R91.8 MULTIPLE PULMONARY NODULES: Primary | ICD-10-CM

## 2022-06-03 NOTE — TELEPHONE ENCOUNTER
Anne-Marie Gibbons MA called patient since she is Cook Islander speaking and instructed patient of the following:       Dr Costa Pugh  will be performing a EBUS on [facility] Rhoda Leaks on 06/08/2022     LOCATION: B     ANTICOAGULATION INSTRUCTIONS: NONE    OTHER MEDICATION INSTRUCTIONS: to be given the night prior by pat     LABS: (CBC/PT/PTT in last 90 days):05/08/2022  (If no, labs ordered / to be done at least one day prior to procedure)    LAST OFFICE NOTE/H&P within 30 days of procedure: 06/02/2022    INSTRUCTIONS GIVEN: patient is to be npo after mid-night the night prior and she will need a ride home  She will receive a call going over the arrival time of the procedure the night before along with any additional instructions     Nadege Nicole

## 2022-06-07 DIAGNOSIS — J20.8 VIRAL BRONCHITIS: Primary | ICD-10-CM

## 2022-06-07 NOTE — TELEPHONE ENCOUNTER
Looks like patient has been cancelled for tomorrow  She is unable to go today for the COVID swab  Dr Austin Costa is aware and is okay with rescheduling the patient

## 2022-06-07 NOTE — TELEPHONE ENCOUNTER
Patient is calling, she currently has a cold and wants to know if its okay for her to still go to her Endobronchial Ultrasound tomorrow   Please advise

## 2022-06-08 NOTE — TELEPHONE ENCOUNTER
[facility], I will keep an eye out for the COVID results

## 2022-06-10 NOTE — TELEPHONE ENCOUNTER
Patient was called by Morgan Salinas MA, since patient is Argentine speaking    Dr Kahn will be performing a EBUS on Natashafredo Chester on 06/22/2022     LOCATION: [facility]    ANTICOAGULATION INSTRUCTIONS: none     OTHER MEDICATION INSTRUCTIONS: none   LABS: (CBC/PT/PTT in last 90 days): 05/08/22  (If no, labs ordered / to be done at least one day prior to procedure)    LAST OFFICE NOTE/H&P within 30 days of procedure: 06/02/2022    INSTRUCTIONS GIVEN: patient was told to me NPO after mid-night the night prior and will need someone to drive her home  She was updated and given the new address of the [facility] and advised she will receive a call the night prior going over the arrival time       Marleni Puente

## 2022-06-21 RX ORDER — ONDANSETRON 2 MG/ML
4 INJECTION INTRAMUSCULAR; INTRAVENOUS ONCE AS NEEDED
Status: CANCELLED | OUTPATIENT
Start: 2022-06-21

## 2022-06-21 RX ORDER — PROMETHAZINE HYDROCHLORIDE 25 MG/ML
6.25 INJECTION, SOLUTION INTRAMUSCULAR; INTRAVENOUS ONCE AS NEEDED
Status: CANCELLED | OUTPATIENT
Start: 2022-06-21

## 2022-06-21 RX ORDER — ONDANSETRON 2 MG/ML
4 INJECTION INTRAMUSCULAR; INTRAVENOUS EVERY 6 HOURS PRN
Status: CANCELLED | OUTPATIENT
Start: 2022-06-21

## 2022-06-21 RX ORDER — HYDROMORPHONE HCL/PF 1 MG/ML
0.5 SYRINGE (ML) INJECTION
Status: CANCELLED | OUTPATIENT
Start: 2022-06-21

## 2022-06-21 RX ORDER — FENTANYL CITRATE 50 UG/ML
50 INJECTION, SOLUTION INTRAMUSCULAR; INTRAVENOUS
Status: CANCELLED | OUTPATIENT
Start: 2022-06-21

## 2022-06-21 RX ORDER — MEPERIDINE HYDROCHLORIDE 25 MG/ML
12.5 INJECTION INTRAMUSCULAR; INTRAVENOUS; SUBCUTANEOUS ONCE AS NEEDED
Status: CANCELLED | OUTPATIENT
Start: 2022-06-21

## 2022-06-21 RX ORDER — SODIUM CHLORIDE 9 MG/ML
125 INJECTION, SOLUTION INTRAVENOUS CONTINUOUS
Status: CANCELLED | OUTPATIENT
Start: 2022-06-21

## 2022-06-21 RX ORDER — FENTANYL CITRATE/PF 50 MCG/ML
50 SYRINGE (ML) INJECTION
Status: CANCELLED | OUTPATIENT
Start: 2022-06-21

## 2022-10-12 PROBLEM — J32.9 SINUSITIS, BACTERIAL: Status: RESOLVED | Noted: 2021-07-22 | Resolved: 2022-10-12

## 2022-10-12 PROBLEM — B96.89 SINUSITIS, BACTERIAL: Status: RESOLVED | Noted: 2021-07-22 | Resolved: 2022-10-12

## 2022-10-28 ENCOUNTER — HOSPITAL ENCOUNTER (EMERGENCY)
Facility: HOSPITAL | Age: 42
Discharge: HOME | End: 2022-10-28
Attending: EMERGENCY MEDICINE

## 2022-10-28 VITALS
SYSTOLIC BLOOD PRESSURE: 143 MMHG | OXYGEN SATURATION: 100 % | RESPIRATION RATE: 16 BRPM | TEMPERATURE: 97.3 F | DIASTOLIC BLOOD PRESSURE: 99 MMHG | HEART RATE: 96 BPM

## 2022-10-28 DIAGNOSIS — H57.89 EYE SWELLING, RIGHT: Primary | ICD-10-CM

## 2022-10-28 RX ORDER — ERYTHROMYCIN 5 MG/G
0.5 OINTMENT OPHTHALMIC ONCE
Status: COMPLETED | OUTPATIENT
Start: 2022-10-28 | End: 2022-10-28

## 2022-10-28 RX ORDER — CARBOXYMETHYLCELLULOSE SODIUM 5 MG/ML
1 SOLUTION/ DROPS OPHTHALMIC 3 TIMES DAILY PRN
Qty: 70 EACH | Refills: 0 | Status: SHIPPED | OUTPATIENT
Start: 2022-10-28

## 2022-10-28 RX ORDER — ERYTHROMYCIN 5 MG/G
OINTMENT OPHTHALMIC
Qty: 3.5 G | Refills: 0 | Status: SHIPPED | OUTPATIENT
Start: 2022-10-28 | End: 2022-10-28 | Stop reason: SDUPTHER

## 2022-10-28 RX ORDER — ERYTHROMYCIN 5 MG/G
OINTMENT OPHTHALMIC
Qty: 3.5 G | Refills: 0 | Status: SHIPPED | OUTPATIENT
Start: 2022-10-28

## 2022-10-28 RX ADMIN — ERYTHROMYCIN 0.5 INCH: 5 OINTMENT OPHTHALMIC at 15:12

## 2022-10-28 NOTE — ED TRIAGE NOTES
[address] w/complaint of swollen left eye states which started "it's been going on for a long time almost a year and I have been here for it"; has been referred to eye specialist "but they don't take my insurance and I'm waiting for medicaid"; states went to pharmacy yesterday to get prescription for glaucoma but they won't give me the medication"; pt states she got the prescription from the emergency department, last year and is now looking for a refill; has not seen a eye specialist over the past year

## 2022-10-28 NOTE — ED PROVIDER NOTES
History  Chief Complaint   Patient presents with   • Eye Problem     [address] w/complaint of swollen left eye states which started "it's been going on for a long time almost a year and I have been here for it"; has been referred to eye specialist "but they don't take my insurance and I'm waiting for medicaid"; states went to pharmacy yesterday to get prescription for glaucoma but they won't give me the medication"; pt states she got the prescription from the emergency department, last year and is now looking for a refill; has not seen a eye specialist over the past year     This is a 41-year-old female with past medical history significant for glaucoma presenting to the emergency department today for left-sided eye swelling for approximately 5 days  The patient was seen in the emergency department in January of 2021 and diagnosed with elevated intra-ocular pressure likely secondary to glaucoma  At that time, the patient was given timolol, latanoprost, Diamox, and TobraDex  She notes she used them for approximately 5 months and no longer has any  For approximately 5 days, the patient has noted some swelling and “crusty drainage” from her left eye  She notes that exacerbated by crying; patient has been crying more frequently because her mom recently passed away  She denies any changes to her vision  She denies any pain with extraocular motions  She notes it feels different than her glaucoma pain but she is still requesting refills on her glaucoma medication  She notes swelling underneath her left eye  She has not followed up with Ophthalmology  The patient denies any trauma to the eye and does not were contacts  The patient denies other complaints at this time        History provided by:  Patient   used: No    Eye Problem  Location:  Left eye  Severity:  Mild  Onset quality:  Gradual  Duration:  5 days  Timing:  Constant  Progression:  Worsening  Chronicity:  New  Relieved by:  None tried  Exacerbated by: crying  Ineffective treatments:  None tried  Associated symptoms: crusting, discharge, swelling and tearing    Associated symptoms: no blurred vision, no decreased vision, no double vision, no facial rash, no headaches, no itching, no nausea, no numbness, no photophobia, no redness, no scotomas, no tingling, no vomiting and no weakness        Prior to Admission Medications   Prescriptions Last Dose Informant Patient Reported? Taking?    Ascorbic Acid (Vitamin C) 500 MG CAPS Not Taking at Unknown time  Yes No   Sig: Take by mouth   Patient not taking: Reported on 10/28/2022   Iron-Vitamin C 100-250 MG TABS Not Taking at Unknown time  Yes No   Sig: Take by mouth   Patient not taking: Reported on 10/28/2022   acetaZOLAMIDE (DIAMOX) 500 mg capsule Not Taking at Unknown time Self No No   Sig: Take 1 capsule (500 mg total) by mouth 2 (two) times a day   Patient not taking: Reported on 10/28/2022   clindamycin-benzoyl peroxide (BENZACLIN) gel Not Taking at Unknown time  No No   Sig: Apply topically 2 (two) times a day   Patient not taking: Reported on 10/28/2022   fluticasone (FLONASE) 50 mcg/act nasal spray Not Taking at Unknown time Self No No   Si sprays into each nostril daily   Patient not taking: Reported on 10/28/2022   latanoprost (XALATAN) 0 005 % ophthalmic solution Not Taking at Unknown time Self No No   Sig: ADMINISTER 1 DROP INTO THE LEFT EYE DAILY AT BEDTIME   Patient not taking: Reported on 10/28/2022   polyvinyl alcohol (LIQUIFILM TEARS) 1 4 % ophthalmic solution Not Taking at Unknown time Self No No   Sig: Administer 1 drop to both eyes as needed for dry eyes   Patient not taking: Reported on 10/28/2022   timolol (TIMOPTIC) 0 5 % ophthalmic solution Not Taking at Unknown time Self No No   Sig: Administer 1 drop into the left eye 2 (two) times a day   Patient not taking: Reported on 10/28/2022   tobramycin-dexamethasone (TOBRADEX) ophthalmic suspension Not Taking at Unknown time Self No No   Sig: Administer 1 drop into the left eye 2 (two) times a day   Patient not taking: Reported on 10/28/2022      Facility-Administered Medications: None       Past Medical History:   Diagnosis Date   • Acid reflux     with pregnancy   • Heart palpitations     with pregnancy   • Urinary tract infection     with pregnancy   • Varicella        Past Surgical History:   Procedure Laterality Date   • DILATION AND CURETTAGE OF UTERUS      x2   • [place]  DELIVERY ONLY N/A 2017    Procedure:  SECTION (); Surgeon: Deanna Haq DO;  Location: [facility];  Service: Obstetrics       Family History   Problem Relation Age of Onset   • No Known Problems Mother    • No Known Problems Father    • No Known Problems Sister    • No Known Problems Brother    • No Known Problems Daughter    • Eczema Son    • No Known Problems Sister    • No Known Problems Sister    • No Known Problems Daughter    • No Known Problems Daughter      I have reviewed and agree with the history as documented  E-Cigarette/Vaping   • E-Cigarette Use Never User    • Comments hookah socially      E-Cigarette/Vaping Substances   • Nicotine No    • THC No    • CBD No    • Flavoring No    • Other No    • Unknown No      Social History     Tobacco Use   • Smoking status: Never Smoker   • Smokeless tobacco: Never Used   Vaping Use   • Vaping Use: Never used   Substance Use Topics   • Alcohol use: Yes     Alcohol/week: 1 0 standard drink     Types: 1 Cans of beer per week   • Drug use: No       Review of Systems   Constitutional: Negative for appetite change, chills, diaphoresis, fatigue and fever  Eyes: Positive for pain and discharge  Negative for blurred vision, double vision, photophobia, redness, itching and visual disturbance  Cardiovascular: Negative for leg swelling  Gastrointestinal: Negative for nausea and vomiting  Musculoskeletal: Negative for neck pain and neck stiffness     Skin: Negative for color change, rash and wound  Neurological: Negative for dizziness, tingling, seizures, syncope, weakness, light-headedness, numbness and headaches  Psychiatric/Behavioral: Negative for confusion  All other systems reviewed and are negative  Physical Exam  Physical Exam  Vitals and nursing note reviewed  Constitutional:       General: She is not in acute distress  Appearance: Normal appearance  She is normal weight  She is not ill-appearing, toxic-appearing or diaphoretic  HENT:      Head: Normocephalic and atraumatic  Nose: Nose normal  No congestion or rhinorrhea  Mouth/Throat:      Mouth: Mucous membranes are moist       Pharynx: No oropharyngeal exudate or posterior oropharyngeal erythema  Eyes:      General: No scleral icterus  Right eye: No discharge  Left eye: Discharge present  Extraocular Movements: Extraocular movements intact  Pupils: Pupils are equal, round, and reactive to light  Comments: The patient has mild purulence drainage from the left eye; left eye shows no scleral injection or "haziness"; no pain with extraocular motions  No rust ring or corneal foreign bodies  Intra-ocular pressures remain between 16 and 17 on 5 separate occasions to the left eye; intra-ocular pressures remain between 11 and 12 on 5 separate occasions on the right eye  Pupils are equal and reactive both to direct and consensual light  Mild swelling underneath left eye; does not appear to be orbital cellulitis or preseptal cellulitis; it is not warm, fluctuant, or excoriated   Neck:      Comments: Non meningeal  Cardiovascular:      Rate and Rhythm: Normal rate and regular rhythm  Pulses: Normal pulses  Heart sounds: Normal heart sounds  No murmur heard  No friction rub  No gallop  Pulmonary:      Effort: Pulmonary effort is normal  No respiratory distress  Breath sounds: Normal breath sounds  No stridor  No wheezing, rhonchi or rales     Chest:      Chest wall: No tenderness  Musculoskeletal:         General: Normal range of motion  Cervical back: Normal range of motion  No tenderness  Right lower leg: No edema  Left lower leg: No edema  Skin:     General: Skin is warm and dry  Capillary Refill: Capillary refill takes less than 2 seconds  Coloration: Skin is not jaundiced or pale  Neurological:      General: No focal deficit present  Mental Status: She is alert and oriented to person, place, and time  Mental status is at baseline  Psychiatric:         Mood and Affect: Mood normal          Behavior: Behavior normal          Vital Signs  ED Triage Vitals [10/28/22 1350]   Temperature Pulse Respirations Blood Pressure SpO2   (!) 97 3 °F (36 3 °C) 96 16 143/99 100 %      Temp Source Heart Rate Source Patient Position - Orthostatic VS BP Location FiO2 (%)   Oral Monitor Sitting Left arm --      Pain Score       --           Vitals:    10/28/22 1350   BP: 143/99   Pulse: 96   Patient Position - Orthostatic VS: Sitting         Visual Acuity      ED Medications  Medications   erythromycin (ILOTYCIN) 0 5 % ophthalmic ointment 0 5 inch (0 5 inches Left Eye Given 10/28/22 1512)       Diagnostic Studies  Results Reviewed     None                 No orders to display              Procedures  Procedures         ED Course  ED Course as of 10/28/22 1808   Fri Oct 28, 2022   1432 TT sent to Dr Joni Real, ophthalmologist on call  IOP left eye between 16 and 17 on five separate measures  IOP right eye is between 11 and 12 on fiver separate measures  Will continue to monitor  [address] with Dr Joni Real with Ophthalmology  No indication for glaucoma drops at this time given normal intra-ocular pressures  He recommends warm and cold compresses alternating in addition to artificial drops  He can see patient on Monday  This was discussed with the patient both in [place] and [place]  Dispo planning                                               MDM  Number of Diagnoses or Management Options  Eye swelling, right: new and requires workup  Diagnosis management comments: This is a 76-year-old female presenting to the emergency department today for left-sided eye swelling and drainage  Exacerbated by crying  Requesting glaucoma medications  Vital signs are stable  On physical exam, the patient has mild purulent drainage from the left eye without scleral injection  Mild non purulent swelling underneath the left eye  Intra-ocular pressures to the bilateral eyes are within normal limits; left eye is between 16 and 17 and right eye is between 11 and 12  She denies any visual disturbances  I discussed the case with Dr Renard Michael with Ophthalmology who notes no indication for further glaucoma treatment at this time  Recommend warm and cool compresses in addition to artificial tears  Dr Renard Michael can see the patient on Monday  I discussed this with the patient both in [place] and in [address]  The patient is stable for discharge at this time  Erythromycin given to the patient here and sent to the patient's pharmacy  Recommend Ophthalmology follow-up; she is aware to follow-up with Dr Renard Michael on Monday  Strict return precautions were given  Recommend PCP follow-up as soon as possible  The patient and/or patient's proxy verify their understanding and agree to the plan at this time  All questions answered to the patient and/or their proxy's satisfaction  All labs reviewed and utilized in the medical decision making process  All radiology studies independently viewed by me and interpreted by the radiologist  Portions of the record may have been created with voice recognition software   Occasional wrong word or "sound a like" substitutions may have occurred due to the inherent limitations of voice recognition software   Read the chart carefully and recognize, using context, where substitutions have occurred           Amount and/or Complexity of Data Reviewed  Tests in the medicine section of CPT®: ordered and reviewed  Review and summarize past medical records: yes  Discuss the patient with other providers: yes (Dr Atilio Pringle - Ophthalmology)    Patient Progress  Patient progress: stable      Disposition  Final diagnoses:   Eye swelling, right     Time reflects when diagnosis was documented in both MDM as applicable and the Disposition within this note     Time User Action Codes Description Comment    10/28/2022  3:02 PM Dominic Clay Add [H57 89] Eye swelling, right       ED Disposition     ED Disposition   Discharge    Condition   Stable    Date/Time   Fri Oct 28, 2022  3:02 PM    Comment   Natasha Doherty discharge to home/self care                 Follow-up Information     Follow up With Specialties Details Why Contact Info Additional [address]  [facility] Schedule an appointment as soon as possible for a visit   [address] [phone]  [facility], [address] [facility]    Ramandeep Ewing MD Ophthalmology Schedule an appointment as soon as possible for a visit   [facility] [address]  [address] [facility] 53       [address] [facility] Emergency Department Emergency Medicine Schedule an appointment as soon as possible for a visit   [address] [facility],[phone]  [address] [facility] Emergency Department, [address], [address] [place] [address] Rd          Discharge Medication List as of 10/28/2022  3:06 PM      START taking these medications    Details   carboxymethylcellulose 0 5 % SOLN Administer 1 drop into the left eye 3 (three) times a day as needed for dry eyes, Starting Fri 10/28/2022, Normal         CONTINUE these medications which have CHANGED    Details   erythromycin (ILOTYCIN) ophthalmic ointment Place a 1/2 inch ribbon of ointment into the lower eyelid  , Normal         CONTINUE these medications which have NOT CHANGED    Details   acetaZOLAMIDE (DIAMOX) 500 mg capsule Take 1 capsule (500 mg total) by mouth 2 (two) times a day, Starting Sat 1/9/2021, Normal      Ascorbic Acid (Vitamin C) 500 MG CAPS Take by mouth, Historical Med      clindamycin-benzoyl peroxide (BENZACLIN) gel Apply topically 2 (two) times a day, Starting Mon 2/14/2022, Normal      fluticasone (FLONASE) 50 mcg/act nasal spray 2 sprays into each nostril daily, Starting Thu 7/22/2021, Normal      Iron-Vitamin C 100-250 MG TABS Take by mouth, Historical Med      latanoprost (XALATAN) 0 005 % ophthalmic solution ADMINISTER 1 DROP INTO THE LEFT EYE DAILY AT BEDTIME, Normal      polyvinyl alcohol (LIQUIFILM TEARS) 1 4 % ophthalmic solution Administer 1 drop to both eyes as needed for dry eyes, Starting Fri 1/29/2021, Normal      timolol (TIMOPTIC) 0 5 % ophthalmic solution Administer 1 drop into the left eye 2 (two) times a day, Starting Fri 1/29/2021, Normal      tobramycin-dexamethasone (TOBRADEX) ophthalmic suspension Administer 1 drop into the left eye 2 (two) times a day, Starting Sat 1/9/2021, Normal             No discharge procedures on file      PDMP Review       Value Time User    PDMP Reviewed  Yes 7/22/2021 11:00 PM Clarissa Kirk MD          ED Provider  Electronically Signed by           Valentino Barley, PA-C  10/28/22 5772

## 2022-10-28 NOTE — DISCHARGE INSTRUCTIONS
Please return to the emergency department for worsening symptoms including chest pain, shortness of breath, dizziness, lightheadedness, fever greater than 103, severe pain, inability to walk, fainting episodes, etc  Please follow-up with your family practice provider as soon as possible  I have sent a cream over to your pharmacy for your symptoms  Please take as directed  Please follow-up with a clinic as soon as possible to establish ophthalmologic care  I spoke with an ophthalmologist here  Dr Lizett Gallego said he can see you on Monday if you would like  You may call him to make an appointment  You may use warm and cold compresses in addition to artificial tears to help with your symptoms  I sent the artificial tears over tear pharmacy  Please take as directed

## 2024-07-05 ENCOUNTER — HOSPITAL ENCOUNTER (EMERGENCY)
Facility: HOSPITAL | Age: 44
Discharge: HOME | End: 2024-07-05
Attending: EMERGENCY MEDICINE

## 2024-07-05 VITALS
RESPIRATION RATE: 20 BRPM | HEART RATE: 88 BPM | TEMPERATURE: 98.3 F | DIASTOLIC BLOOD PRESSURE: 107 MMHG | SYSTOLIC BLOOD PRESSURE: 180 MMHG | OXYGEN SATURATION: 99 %

## 2024-07-05 DIAGNOSIS — L23.7 CONTACT DERMATITIS DUE TO POISON IVY: Primary | ICD-10-CM

## 2024-07-05 PROCEDURE — 99284 EMERGENCY DEPT VISIT MOD MDM: CPT | Performed by: EMERGENCY MEDICINE

## 2024-07-05 PROCEDURE — 99282 EMERGENCY DEPT VISIT SF MDM: CPT

## 2024-07-05 RX ORDER — DIPHENHYDRAMINE HCL 25 MG
25 TABLET ORAL EVERY 6 HOURS
Qty: 20 TABLET | Refills: 0 | Status: SHIPPED | OUTPATIENT
Start: 2024-07-05

## 2024-07-05 RX ORDER — PREDNISONE 20 MG/1
40 TABLET ORAL ONCE
Status: COMPLETED | OUTPATIENT
Start: 2024-07-05 | End: 2024-07-05

## 2024-07-05 RX ORDER — PREDNISONE 20 MG/1
40 TABLET ORAL DAILY
Qty: 8 TABLET | Refills: 0 | Status: SHIPPED | OUTPATIENT
Start: 2024-07-05 | End: 2024-07-09

## 2024-07-05 RX ORDER — PREDNISONE 10 MG/1
40 TABLET ORAL DAILY
Qty: 16 TABLET | Refills: 0 | Status: SHIPPED | OUTPATIENT
Start: 2024-07-05 | End: 2024-07-05

## 2024-07-05 RX ADMIN — PREDNISONE 40 MG: 20 TABLET ORAL at 14:51

## 2024-07-05 NOTE — ED PROVIDER NOTES
History  Chief Complaint   Patient presents with    Rash     Patient arrives to the Ed with complain of poison ivy rash for the past 2 weeks. Patient states she has been trying over the counter medications but it has been spreading. To the rest of her body.      44-year-old female with no significant PMH who presents to the ED following a rash has been ongoing for the past 2 weeks.  Patient states that she was out in her garden when she was cutting down a push and started to develop this rash.  She has been using at home cortisone cream with minimal to no relief in her symptoms.  Patient states that she is kept the same but down for the past 3 years and never developed a reaction but this year she did.  Patient states that the rash she is having is itchy and has been unable to sleep due to the symptoms.  Patient denies any recent changes in laundry detergent, soaps, or lotions.  Patient denies any other symptoms such as fever, chills, SOB, or chest pain. Denies chest pain, SOB, cough, abdominal pain, n/v/d, fever, chills, dizziness, lightheadedness, HA, dysuria, hematuria, hematochezia, or melena.           Prior to Admission Medications   Prescriptions Last Dose Informant Patient Reported? Taking?   Ascorbic Acid (Vitamin C) 500 MG CAPS Not Taking  Yes No   Sig: Take by mouth   Patient not taking: Reported on 10/28/2022   Iron-Vitamin C 100-250 MG TABS Not Taking  Yes No   Sig: Take by mouth   Patient not taking: Reported on 10/28/2022   acetaZOLAMIDE (DIAMOX) 500 mg capsule Not Taking Self No No   Sig: Take 1 capsule (500 mg total) by mouth 2 (two) times a day   Patient not taking: Reported on 10/28/2022   carboxymethylcellulose 0.5 % SOLN Not Taking  No No   Sig: Administer 1 drop into the left eye 3 (three) times a day as needed for dry eyes   Patient not taking: Reported on 7/5/2024   clindamycin-benzoyl peroxide (BENZACLIN) gel Not Taking  No No   Sig: Apply topically 2 (two) times a day   Patient not taking:  Reported on 10/28/2022   erythromycin (ILOTYCIN) ophthalmic ointment Not Taking  No No   Sig: Place a 1/2 inch ribbon of ointment into the lower eyelid.   Patient not taking: Reported on 2024   fluticasone (FLONASE) 50 mcg/act nasal spray Not Taking Self No No   Si sprays into each nostril daily   Patient not taking: Reported on 10/28/2022   latanoprost (XALATAN) 0.005 % ophthalmic solution Not Taking Self No No   Sig: ADMINISTER 1 DROP INTO THE LEFT EYE DAILY AT BEDTIME   Patient not taking: Reported on 10/28/2022   polyvinyl alcohol (LIQUIFILM TEARS) 1.4 % ophthalmic solution Not Taking Self No No   Sig: Administer 1 drop to both eyes as needed for dry eyes   Patient not taking: Reported on 10/28/2022   timolol (TIMOPTIC) 0.5 % ophthalmic solution Not Taking Self No No   Sig: Administer 1 drop into the left eye 2 (two) times a day   Patient not taking: Reported on 10/28/2022   tobramycin-dexamethasone (TOBRADEX) ophthalmic suspension Not Taking Self No No   Sig: Administer 1 drop into the left eye 2 (two) times a day   Patient not taking: Reported on 10/28/2022      Facility-Administered Medications: None       Past Medical History:   Diagnosis Date    Acid reflux     with pregnancy    Heart palpitations     with pregnancy    Urinary tract infection     with pregnancy    Varicella        Past Surgical History:   Procedure Laterality Date    DILATION AND CURETTAGE OF UTERUS      x2    NE  DELIVERY ONLY N/A 2017    Procedure:  SECTION ();  Surgeon: Shahla Zheng DO;  Location: [facility] ;  Service: Obstetrics       Family History   Problem Relation Age of Onset    No Known Problems Mother     No Known Problems Father     No Known Problems Sister     No Known Problems Brother     No Known Problems Daughter     Eczema Son     No Known Problems Sister     No Known Problems Sister     No Known Problems Daughter     No Known Problems Daughter      I have reviewed and agree with the  history as documented.    E-Cigarette/Vaping    E-Cigarette Use Never User     Comments hookah socially      E-Cigarette/Vaping Substances    Nicotine No     THC No     CBD No     Flavoring No     Other No     Unknown No      Social History     Tobacco Use    Smoking status: Some Days     Current packs/day: 0.25     Average packs/day: 0.3 packs/day for 2.5 years (0.6 ttl pk-yrs)     Types: Cigarettes     Start date: 2022    Smokeless tobacco: Never   Vaping Use    Vaping status: Never Used   Substance Use Topics    Alcohol use: Yes     Alcohol/week: 12.0 standard drinks of alcohol     Types: 12 Cans of beer per week    Drug use: No        Review of Systems   Constitutional:  Negative for appetite change, chills, diaphoresis, fatigue and fever.   HENT:  Negative for congestion, ear pain, postnasal drip, rhinorrhea, sore throat and trouble swallowing.    Eyes:  Negative for pain and visual disturbance.   Respiratory:  Negative for cough and shortness of breath.    Cardiovascular:  Negative for chest pain and palpitations.   Gastrointestinal:  Negative for abdominal pain, constipation, diarrhea, nausea and vomiting.   Genitourinary:  Negative for decreased urine volume, dysuria and hematuria.   Musculoskeletal:  Negative for arthralgias and back pain.   Skin:  Positive for rash. Negative for color change.   Neurological:  Negative for dizziness, seizures, syncope, weakness, light-headedness and headaches.   All other systems reviewed and are negative.      Physical Exam  ED Triage Vitals [07/05/24 1417]   Temperature Pulse Respirations Blood Pressure SpO2   98.3 °F (36.8 °C) 87 20 (!) 171/116 100 %      Temp Source Heart Rate Source Patient Position - Orthostatic VS BP Location FiO2 (%)   Oral Monitor Lying Left arm --      Pain Score       --             Orthostatic Vital Signs  Vitals:    07/05/24 1417 07/05/24 1454   BP: (!) 171/116 (!) 180/107   Pulse: 87 88   Patient Position - Orthostatic VS: Lying Lying        Physical Exam  Vitals and nursing note reviewed.   Constitutional:       General: She is not in acute distress.     Appearance: Normal appearance. She is normal weight.   HENT:      Head: Normocephalic and atraumatic.      Right Ear: External ear normal.      Left Ear: External ear normal.      Nose: Nose normal.      Mouth/Throat:      Pharynx: Oropharynx is clear.   Eyes:      Conjunctiva/sclera: Conjunctivae normal.   Cardiovascular:      Rate and Rhythm: Normal rate and regular rhythm.      Pulses: Normal pulses.      Heart sounds: Normal heart sounds.      Comments: RRR with +S1 and S2, no murmurs appreciated on exam. Peripheral pulses intact.    Pulmonary:      Effort: Pulmonary effort is normal. No respiratory distress.      Breath sounds: Normal breath sounds. No wheezing, rhonchi or rales.      Comments: CTABL with no abnormal lung sounds such as wheezes or rales appreciated on exam.     Abdominal:      General: Abdomen is flat. Bowel sounds are normal. There is no distension.      Palpations: Abdomen is soft.      Tenderness: There is no abdominal tenderness.      Comments: Soft, non tender, normo-active bowel sounds. Without rigidity, guarding, or distension.     Musculoskeletal:         General: Normal range of motion.      Cervical back: Normal range of motion.   Skin:     General: Skin is warm and dry.      Findings: Rash present.      Comments: See clinical media for description. Rash suggestive for contact dermatitis.   Neurological:      General: No focal deficit present.      Mental Status: She is alert and oriented to person, place, and time. Mental status is at baseline.      Comments: CN grossly intact on visualization. No focal neurologic deficits noted on exam.  5/5 strength in all extremities. Neurovascularly intact with normal sensation and motor function.             ED Medications  Medications   predniSONE tablet 40 mg (40 mg Oral Given 7/5/24 5802)       Diagnostic Studies  Results  Reviewed       None                   No orders to display         Procedures  Procedures      ED Course                             SBIRT 22yo+      Flowsheet Row Most Recent Value   Initial Alcohol Screen: US AUDIT-C     1. How often do you have a drink containing alcohol? 3 Filed at: 07/05/2024 1457   2. How many drinks containing alcohol do you have on a typical day you are drinking?  6 Filed at: 07/05/2024 1457   3b. FEMALE Any Age, or MALE 65+: How often do you have 4 or more drinks on one occassion? 3 Filed at: 07/05/2024 1457   Audit-C Score 12 Filed at: 07/05/2024 1457   JESSICA: How many times in the past year have you...    Used an illegal drug or used a prescription medication for non-medical reasons? Never Filed at: 07/05/2024 1457                  Medical Decision Making  44-year-old female with no significant PMH who presented to the ED following a rash that is been ongoing for the past 2 weeks.  Upon examination at bedside, patient was noted to have systemic rash more prominent on her forearms with surrounding erythema and vesicular formation.  See clinical media for more description of the rash.  Patient was reassured of her symptoms and explained she is likely having contact dermatitis following poison ivy exposure.  While in the emergency department, patient was given 40 mg of prednisone for symptomatic treatment.  Through shared decision making to the patient and the provider, the patient was planned for discharge.  Patient was advised to follow-up outpatient with her PCP as needed as well as provided a prescription for 5 days of prednisone and Benadryl as needed for pruritus.  Patient was also instructed to return to the ED if her symptoms worsen including but not limited to fever, chills, nausea or vomiting, worsening of the rash, abdominal pain, shortness of breath, chest pain, or changes in her behavior.    Risk  OTC drugs.  Prescription drug management.          Disposition  Final diagnoses:    Contact dermatitis due to poison ivy     Time reflects when diagnosis was documented in both MDM as applicable and the Disposition within this note       Time User Action Codes Description Comment    7/5/2024  2:41 PM Brice Ferreira Add [L23.7] Contact dermatitis due to poison ivy           ED Disposition       ED Disposition   Discharge    Condition   Stable    Date/Time   Fri Jul 5, 2024 1444    Comment   Natasha Merchant discharge to home/self care.                   Follow-up Information       Follow up With Specialties Details Why Contact Info Additional Information    [facility] Emergency Department Emergency Medicine Go to  If symptoms worsen [address] [address]  [phone] [facility] Emergency Department, [address] [address]            Discharge Medication List as of 7/5/2024  2:45 PM        START taking these medications    Details   diphenhydrAMINE (BENADRYL) 25 mg tablet Take 1 tablet (25 mg total) by mouth every 6 (six) hours, Starting Fri 7/5/2024, Normal           CONTINUE these medications which have CHANGED    Details   predniSONE 20 mg tablet Take 2 tablets (40 mg total) by mouth daily for 4 days 6 tabs for 3 days and then decrease by one tab every 3 days until complete, Starting Fri 7/5/2024, Until Tue 7/9/2024, Normal           CONTINUE these medications which have NOT CHANGED    Details   acetaZOLAMIDE (DIAMOX) 500 mg capsule Take 1 capsule (500 mg total) by mouth 2 (two) times a day, Starting Sat 1/9/2021, Normal      Ascorbic Acid (Vitamin C) 500 MG CAPS Take by mouth, Historical Med      carboxymethylcellulose 0.5 % SOLN Administer 1 drop into the left eye 3 (three) times a day as needed for dry eyes, Starting Fri 10/28/2022, Normal      clindamycin-benzoyl peroxide (BENZACLIN) gel Apply topically 2 (two) times a day, Starting Mon 2/14/2022, Normal      erythromycin (ILOTYCIN) ophthalmic ointment Place a  1/2 inch ribbon of ointment into the lower eyelid., Normal      fluticasone (FLONASE) 50 mcg/act nasal spray 2 sprays into each nostril daily, Starting Thu 7/22/2021, Normal      Iron-Vitamin C 100-250 MG TABS Take by mouth, Historical Med      latanoprost (XALATAN) 0.005 % ophthalmic solution ADMINISTER 1 DROP INTO THE LEFT EYE DAILY AT BEDTIME, Normal      polyvinyl alcohol (LIQUIFILM TEARS) 1.4 % ophthalmic solution Administer 1 drop to both eyes as needed for dry eyes, Starting Fri 1/29/2021, Normal      timolol (TIMOPTIC) 0.5 % ophthalmic solution Administer 1 drop into the left eye 2 (two) times a day, Starting Fri 1/29/2021, Normal      tobramycin-dexamethasone (TOBRADEX) ophthalmic suspension Administer 1 drop into the left eye 2 (two) times a day, Starting Sat 1/9/2021, Normal           No discharge procedures on file.    PDMP Review         Value Time User    PDMP Reviewed  Yes 7/22/2021 11:00 PM Husam Bryant MD             ED Provider  Attending physically available and evaluated Natasha Merchant. I managed the patient along with the ED Attending.    Electronically Signed by           Brice Ferreira MD  07/05/24 8521

## 2024-07-05 NOTE — Clinical Note
Natasha Merchant was seen and treated in our emergency department on 7/5/2024.    No restrictions            Diagnosis:     Natasha  may return to work on return date.    She may return on this date: 07/07/2024         If you have any questions or concerns, please don't hesitate to call.      Brice Ferreira MD    ______________________________           _______________          _______________  Hospital Representative                              Date                                Time

## 2024-07-05 NOTE — ED ATTENDING ATTESTATION
7/5/2024  I, Kyle Brown DO, saw and evaluated the patient. I have discussed the patient with the resident/non-physician practitioner and agree with the resident's/non-physician practitioner's findings, Plan of Care, and MDM as documented in the resident's/non-physician practitioner's note, except where noted. All available labs and Radiology studies were reviewed.  I was present for key portions of any procedure(s) performed by the resident/non-physician practitioner and I was immediately available to provide assistance.       At this point I agree with the current assessment done in the Emergency Department.  I have conducted an independent evaluation of this patient a history and physical is as follows:  44-year-old female presents to the emergency department with a rash.  Present for approximately 2 weeks.  Patient has a diffuse papular rash.  I considered contact dermatitis, poison ivy, allergic reaction.  T  These and other diagnoses were considered.   The patient has an exam consistent with poison ivy. here are no red flag symptoms including mucous membrane involvement. She will be given steroids and discharged with follow-up to her primary care physician.  ED Course         Critical Care Time  Procedures

## 2025-04-14 ENCOUNTER — HOSPITAL ENCOUNTER (EMERGENCY)
Facility: HOSPITAL | Age: 45
Discharge: HOME | End: 2025-04-14
Attending: EMERGENCY MEDICINE

## 2025-04-14 VITALS
RESPIRATION RATE: 18 BRPM | HEART RATE: 101 BPM | OXYGEN SATURATION: 100 % | TEMPERATURE: 98.1 F | SYSTOLIC BLOOD PRESSURE: 168 MMHG | DIASTOLIC BLOOD PRESSURE: 109 MMHG

## 2025-04-14 DIAGNOSIS — J32.9 RHINOSINUSITIS: Primary | ICD-10-CM

## 2025-04-14 PROCEDURE — 99284 EMERGENCY DEPT VISIT MOD MDM: CPT | Performed by: PHYSICIAN ASSISTANT

## 2025-04-14 PROCEDURE — 96372 THER/PROPH/DIAG INJ SC/IM: CPT

## 2025-04-14 PROCEDURE — 99284 EMERGENCY DEPT VISIT MOD MDM: CPT

## 2025-04-14 RX ORDER — DEXAMETHASONE SODIUM PHOSPHATE 10 MG/ML
10 INJECTION, SOLUTION INTRAMUSCULAR; INTRAVENOUS ONCE
Status: COMPLETED | OUTPATIENT
Start: 2025-04-14 | End: 2025-04-14

## 2025-04-14 RX ORDER — FLUTICASONE PROPIONATE 50 MCG
1 SPRAY, SUSPENSION (ML) NASAL 2 TIMES DAILY
Qty: 16 G | Refills: 0 | Status: SHIPPED | OUTPATIENT
Start: 2025-04-14

## 2025-04-14 RX ADMIN — DEXAMETHASONE SODIUM PHOSPHATE 10 MG: 10 INJECTION, SOLUTION INTRAMUSCULAR; INTRAVENOUS at 19:41

## 2025-04-14 NOTE — ED PROVIDER NOTES
Time reflects when diagnosis was documented in both MDM as applicable and the Disposition within this note       Time User Action Codes Description Comment    2025  7:36 PM Marcela Barnett [J32.9] Rhinosinusitis           ED Disposition       ED Disposition   Discharge    Condition   Stable    Date/Time     7:35 PM    Comment   Natasha Jamey Moranarez discharge to home/self care.                   Assessment & Plan       Medical Decision Making  Patient with ongoing allergy symptoms increased pressure, discharge  Considered rhinitis, allergies, sinusitis, polyps, strep infection, viral infection  Exam consistent with rhinosinusitis will treat symptoms here with steroids, discharge on course of antibiotics, antihistamine, decongestant, nasal steroids  Stable for discharge outpatient follow-up with PCP, ENT referral also given    Amount and/or Complexity of Data Reviewed  External Data Reviewed: notes.    Risk  OTC drugs.  Prescription drug management.             Medications   dexamethasone (PF) (DECADRON) injection 10 mg (10 mg Intramuscular Given 25)       ED Risk Strat Scores                    No data recorded                            History of Present Illness       Chief Complaint   Patient presents with    Cold Like Symptoms     Congested for the last week or two, taken OTC medications with no relief.        Past Medical History:   Diagnosis Date    Acid reflux     with pregnancy    Heart palpitations     with pregnancy    Urinary tract infection     with pregnancy    Varicella       Past Surgical History:   Procedure Laterality Date    DILATION AND CURETTAGE OF UTERUS      x2    [place]  DELIVERY ONLY N/A 2017    Procedure:  SECTION ();  Surgeon: Shahla Zheng DO;  Location: [facility];  Service: Obstetrics      Family History   Problem Relation Age of Onset    No Known Problems Mother     No Known Problems Father     No Known Problems Sister     No  Known Problems Brother     No Known Problems Daughter     Eczema Son     No Known Problems Sister     No Known Problems Sister     No Known Problems Daughter     No Known Problems Daughter       Social History     Tobacco Use    Smoking status: Some Days     Current packs/day: 0.25     Average packs/day: 0.3 packs/day for 3.3 years (0.8 ttl pk-yrs)     Types: Cigarettes     Start date:     Smokeless tobacco: Never   Vaping Use    Vaping status: Never Used   Substance Use Topics    Alcohol use: Yes     Alcohol/week: 12.0 standard drinks of alcohol     Types: 12 Cans of beer per week    Drug use: No      E-Cigarette/Vaping    E-Cigarette Use Never User     Comments hookah socially       E-Cigarette/Vaping Substances    Nicotine No     THC No     CBD No     Flavoring No     Other No     Unknown No       I have reviewed and agree with the history as documented.     PMH: Acne vulgaris, Varicose veins of both lower extremities with pain, Multiple pulmonary nodules, Mediastinal lymphadenopathy, Acid reflux, Heart palpitations,   PSH: D&C of uterus,  DELIVERY,     Pt presents to ED c/o ongoing allergy symptoms cw nasal/sinus congestion, trouble breathing bc so congested, tactile fever, none here  NO chills, cp, sob, abd pain, NVD, rash, itching        Review of Systems   Constitutional:  Negative for chills and fever.   HENT:  Positive for congestion, postnasal drip, rhinorrhea and sinus pressure. Negative for ear discharge, ear pain, facial swelling, sore throat and trouble swallowing.    Respiratory:  Negative for shortness of breath.    Cardiovascular:  Negative for chest pain.   Gastrointestinal:  Negative for diarrhea and vomiting.   Skin:  Negative for rash.   Neurological:  Negative for weakness.   All other systems reviewed and are negative.          Objective       ED Triage Vitals   Temperature Pulse BP Respirations SpO2 Patient Position - Orthostatic VS   25 -- 25  04/14/25 1906 04/14/25 1906   98.1 °F (36.7 °C) 101  18 100 % Sitting      Temp Source Heart Rate Source BP Location FiO2 (%) Pain Score    04/14/25 1906 04/14/25 1906 04/14/25 1906 -- --    Oral Monitor Right arm        Vitals      Date and Time Temp Pulse SpO2 Resp BP Pain Score FACES Pain Rating User   04/14/25 1943 98.1 °F (36.7 °C) -- -- -- -- -- -- SR   04/14/25 1906 -- 101 100 % 18 -- -- -- SR            Physical Exam  Vitals and nursing note reviewed.   Constitutional:       General: She is in acute distress (mild).      Appearance: She is well-developed.   HENT:      Head: Normocephalic and atraumatic.      Right Ear: Tympanic membrane, ear canal and external ear normal.      Left Ear: Tympanic membrane, ear canal and external ear normal.      Nose: Congestion and rhinorrhea present.      Comments: Bilateral large nasal turbinates with near complete occlusion of right nares     Mouth/Throat:      Mouth: Mucous membranes are moist.      Pharynx: Oropharynx is clear. No oropharyngeal exudate or posterior oropharyngeal erythema.   Eyes:      Conjunctiva/sclera: Conjunctivae normal.   Cardiovascular:      Rate and Rhythm: Normal rate and regular rhythm.   Pulmonary:      Effort: Pulmonary effort is normal. No respiratory distress.      Breath sounds: Normal breath sounds. No wheezing or rhonchi.   Abdominal:      General: Bowel sounds are normal.      Palpations: Abdomen is soft.   Musculoskeletal:         General: Normal range of motion.      Cervical back: Normal range of motion.   Lymphadenopathy:      Cervical: No cervical adenopathy.   Skin:     General: Skin is warm and dry.      Findings: No rash.   Neurological:      Mental Status: She is alert.   Psychiatric:         Behavior: Behavior normal.         Results Reviewed       None            No orders to display       Procedures    ED Medication and Procedure Management   Prior to Admission Medications   Prescriptions Last Dose Informant Patient  Reported? Taking?   diphenhydrAMINE (BENADRYL) 25 mg tablet   No No   Sig: Take 1 tablet (25 mg total) by mouth every 6 (six) hours      Facility-Administered Medications: None     Patient's Medications   Discharge Prescriptions    AMOXICILLIN-CLAVULANATE (AUGMENTIN) 875-125 MG PER TABLET    Take 1 tablet by mouth every 12 (twelve) hours for 7 days       Start Date: 4/14/2025 End Date: 4/21/2025       Order Dose: 1 tablet       Quantity: 14 tablet    Refills: 0    FLUTICASONE (FLONASE) 50 MCG/ACT NASAL SPRAY    1 spray into each nostril 2 (two) times a day       Start Date: 4/14/2025 End Date: --       Order Dose: 1 spray       Quantity: 16 g    Refills: 0    LORATADINE-PSEUDOEPHEDRINE (CLARITIN-D 12-HOUR) 5-120 MG PER TABLET    Take 1 tablet by mouth 2 (two) times a day for 10 days       Start Date: 4/14/2025 End Date: 4/24/2025       Order Dose: 1 tablet       Quantity: 20 tablet    Refills: 0     No discharge procedures on file.  ED SEPSIS DOCUMENTATION   Time reflects when diagnosis was documented in both MDM as applicable and the Disposition within this note       Time User Action Codes Description Comment    4/14/2025  7:36 PM Marcela Barnett [J32.9] Rhinosinusitis                  Marcela Barnett PA-C  04/14/25 1952

## 2025-07-30 ENCOUNTER — TELEPHONE (OUTPATIENT)
Dept: OBGYN CLINIC | Facility: CLINIC | Age: 45
End: 2025-07-30

## 2025-08-05 ENCOUNTER — OFFICE VISIT (OUTPATIENT)
Dept: OBGYN CLINIC | Facility: CLINIC | Age: 45
End: 2025-08-05

## 2025-08-05 VITALS
BODY MASS INDEX: 27.43 KG/M2 | WEIGHT: 159.8 LBS | DIASTOLIC BLOOD PRESSURE: 100 MMHG | SYSTOLIC BLOOD PRESSURE: 166 MMHG

## 2025-08-05 DIAGNOSIS — N64.4 BREAST PAIN: Primary | ICD-10-CM

## 2025-08-05 PROBLEM — Z32.02 NEGATIVE PREGNANCY TEST: Status: RESOLVED | Noted: 2019-12-03 | Resolved: 2025-08-05

## 2025-08-05 PROBLEM — N91.2 AMENORRHEA: Status: RESOLVED | Noted: 2019-12-03 | Resolved: 2025-08-05

## 2025-08-05 PROBLEM — Z32.01 POSITIVE URINE PREGNANCY TEST: Status: RESOLVED | Noted: 2019-11-19 | Resolved: 2025-08-05

## 2025-08-05 PROCEDURE — 99203 OFFICE O/P NEW LOW 30 MIN: CPT | Performed by: NURSE PRACTITIONER

## (undated) DEVICE — SUT MONOCRYL 0 CTX 36 IN Y398H

## (undated) DEVICE — SUT MONOCRYL 0 36 IN UNDYED Y946H

## (undated) DEVICE — CHLORAPREP HI-LITE 26ML ORANGE

## (undated) DEVICE — SUT PLAIN 3-0 CT-1 27 IN 842H

## (undated) DEVICE — ADHESIVE SKN CLSR HISTOACRYL FLEX 0.5ML LF

## (undated) DEVICE — PACK C-SECTION PBDS

## (undated) DEVICE — SUT VICRYL 0 CT-1 36 IN J946H

## (undated) DEVICE — GLOVE SRG BIOGEL ECLIPSE 7

## (undated) DEVICE — Device

## (undated) DEVICE — GLOVE INDICATOR PI UNDERGLOVE SZ 7.5 BLUE

## (undated) DEVICE — SUT MONOCRYL 4-0 PS-2 27 IN Y426H

## (undated) DEVICE — SKIN MARKER DUAL TIP WITH RULER CAP, FLEXIBLE RULER AND LABELS: Brand: DEVON